# Patient Record
Sex: MALE | Race: WHITE | NOT HISPANIC OR LATINO | Employment: OTHER | URBAN - METROPOLITAN AREA
[De-identification: names, ages, dates, MRNs, and addresses within clinical notes are randomized per-mention and may not be internally consistent; named-entity substitution may affect disease eponyms.]

---

## 2023-01-01 ENCOUNTER — APPOINTMENT (OUTPATIENT)
Dept: GENERAL RADIOLOGY | Facility: HOSPITAL | Age: 53
End: 2023-01-01
Payer: MEDICARE

## 2023-01-01 ENCOUNTER — HOSPITAL ENCOUNTER (EMERGENCY)
Facility: HOSPITAL | Age: 53
Discharge: HOME | End: 2023-01-01
Attending: EMERGENCY MEDICINE | Admitting: EMERGENCY MEDICINE
Payer: MEDICARE

## 2023-01-01 ENCOUNTER — APPOINTMENT (OUTPATIENT)
Dept: CT IMAGING | Facility: HOSPITAL | Age: 53
End: 2023-01-01
Payer: MEDICARE

## 2023-01-01 VITALS
SYSTOLIC BLOOD PRESSURE: 120 MMHG | BODY MASS INDEX: 42.59 KG/M2 | HEART RATE: 78 BPM | OXYGEN SATURATION: 92 % | DIASTOLIC BLOOD PRESSURE: 72 MMHG | HEIGHT: 66 IN | WEIGHT: 264.99 LBS | TEMPERATURE: 99.1 F | RESPIRATION RATE: 18 BRPM

## 2023-01-01 DIAGNOSIS — R60.0 PEDAL EDEMA: ICD-10-CM

## 2023-01-01 DIAGNOSIS — J44.1 ACUTE EXACERBATION OF CHRONIC OBSTRUCTIVE PULMONARY DISEASE (COPD): Primary | ICD-10-CM

## 2023-01-01 LAB
ALBUMIN SERPL-MCNC: 3.9 G/DL (ref 3.5–5.2)
ALBUMIN/GLOB SERPL: 0.8 G/DL
ALP SERPL-CCNC: 143 U/L (ref 39–117)
ALT SERPL W P-5'-P-CCNC: 51 U/L (ref 1–41)
AMMONIA BLD-SCNC: 54 UMOL/L (ref 16–60)
AMPHET+METHAMPHET UR QL: NEGATIVE
ANION GAP SERPL CALCULATED.3IONS-SCNC: 10.1 MMOL/L (ref 5–15)
APTT PPP: 34.3 SECONDS (ref 24.2–34.2)
ARTERIAL PATENCY WRIST A: ABNORMAL
AST SERPL-CCNC: 59 U/L (ref 1–40)
BARBITURATES UR QL SCN: NEGATIVE
BASE EXCESS BLDA CALC-SCNC: 5.6 MMOL/L (ref -2–2)
BASOPHILS # BLD AUTO: 0.06 10*3/MM3 (ref 0–0.2)
BASOPHILS NFR BLD AUTO: 0.9 % (ref 0–1.5)
BDY SITE: ABNORMAL
BENZODIAZ UR QL SCN: NEGATIVE
BILIRUB SERPL-MCNC: 0.4 MG/DL (ref 0–1.2)
BILIRUB UR QL STRIP: NEGATIVE
BUN SERPL-MCNC: 15 MG/DL (ref 6–20)
BUN/CREAT SERPL: 14.6 (ref 7–25)
CA-I BLDA-SCNC: 1.16 MMOL/L (ref 1.13–1.32)
CALCIUM SPEC-SCNC: 9.4 MG/DL (ref 8.6–10.5)
CANNABINOIDS SERPL QL: NEGATIVE
CHLORIDE BLDA-SCNC: 101 MMOL/L (ref 98–106)
CHLORIDE SERPL-SCNC: 101 MMOL/L (ref 98–107)
CLARITY UR: CLEAR
CO2 SERPL-SCNC: 26.9 MMOL/L (ref 22–29)
COCAINE UR QL: NEGATIVE
COHGB MFR BLD: 3.3 % (ref 0–1.5)
COLOR UR: YELLOW
CREAT SERPL-MCNC: 1.03 MG/DL (ref 0.76–1.27)
DEPRECATED RDW RBC AUTO: 54.9 FL (ref 37–54)
EGFRCR SERPLBLD CKD-EPI 2021: 86.3 ML/MIN/1.73
EOSINOPHIL # BLD AUTO: 0.36 10*3/MM3 (ref 0–0.4)
EOSINOPHIL NFR BLD AUTO: 5.5 % (ref 0.3–6.2)
ERYTHROCYTE [DISTWIDTH] IN BLOOD BY AUTOMATED COUNT: 15.2 % (ref 12.3–15.4)
ETHANOL BLD-MCNC: <10 MG/DL (ref 0–10)
ETHANOL UR QL: <0.01 %
FHHB: 3.3 % (ref 0–5)
FLUAV AG NPH QL: NEGATIVE
FLUBV AG NPH QL IA: NEGATIVE
GAS FLOW AIRWAY: 2 LPM
GLOBULIN UR ELPH-MCNC: 4.8 GM/DL
GLUCOSE BLDA-MCNC: 96 MG/DL (ref 70–99)
GLUCOSE BLDC GLUCOMTR-MCNC: 108 MG/DL (ref 70–99)
GLUCOSE SERPL-MCNC: 88 MG/DL (ref 65–99)
GLUCOSE UR STRIP-MCNC: NEGATIVE MG/DL
HCO3 BLDA-SCNC: 31.7 MMOL/L (ref 22–26)
HCT VFR BLD AUTO: 45.2 % (ref 37.5–51)
HGB BLD-MCNC: 14.8 G/DL (ref 13–17.7)
HGB BLDA-MCNC: 16.1 G/DL (ref 13.8–16.4)
HGB UR QL STRIP.AUTO: NEGATIVE
HOLD SPECIMEN: NORMAL
HOLD SPECIMEN: NORMAL
IMM GRANULOCYTES # BLD AUTO: 0.1 10*3/MM3 (ref 0–0.05)
IMM GRANULOCYTES NFR BLD AUTO: 1.5 % (ref 0–0.5)
INHALED O2 CONCENTRATION: 28 %
INR PPP: 1 (ref 0.86–1.15)
KETONES UR QL STRIP: NEGATIVE
LACTATE BLDA-SCNC: 1.5 MMOL/L (ref 0.5–2)
LEUKOCYTE ESTERASE UR QL STRIP.AUTO: NEGATIVE
LIPASE SERPL-CCNC: 41 U/L (ref 13–60)
LYMPHOCYTES # BLD AUTO: 1.26 10*3/MM3 (ref 0.7–3.1)
LYMPHOCYTES NFR BLD AUTO: 19.1 % (ref 19.6–45.3)
MCH RBC QN AUTO: 31.9 PG (ref 26.6–33)
MCHC RBC AUTO-ENTMCNC: 32.7 G/DL (ref 31.5–35.7)
MCV RBC AUTO: 97.4 FL (ref 79–97)
METHADONE UR QL SCN: NEGATIVE
METHGB BLD QL: 0.2 % (ref 0–1.5)
MODALITY: ABNORMAL
MONOCYTES # BLD AUTO: 0.92 10*3/MM3 (ref 0.1–0.9)
MONOCYTES NFR BLD AUTO: 13.9 % (ref 5–12)
NEUTROPHILS NFR BLD AUTO: 3.9 10*3/MM3 (ref 1.7–7)
NEUTROPHILS NFR BLD AUTO: 59.1 % (ref 42.7–76)
NITRITE UR QL STRIP: NEGATIVE
NOTE: ABNORMAL
NRBC BLD AUTO-RTO: 0 /100 WBC (ref 0–0.2)
NT-PROBNP SERPL-MCNC: <36 PG/ML (ref 0–900)
OPIATES UR QL: NEGATIVE
OXYCODONE UR QL SCN: NEGATIVE
OXYHGB MFR BLDV: 93.2 % (ref 94–99)
PCO2 BLDA: 51.1 MM HG (ref 35–45)
PH BLDA: 7.41 PH UNITS (ref 7.35–7.45)
PH UR STRIP.AUTO: 6 [PH] (ref 5–8)
PLATELET # BLD AUTO: 174 10*3/MM3 (ref 140–450)
PMV BLD AUTO: 12.1 FL (ref 6–12)
PO2 BLD: 312 MM[HG] (ref 0–500)
PO2 BLDA: 87.4 MM HG (ref 80–100)
POTASSIUM BLDA-SCNC: 3.5 MMOL/L (ref 3.5–5)
POTASSIUM SERPL-SCNC: 3.9 MMOL/L (ref 3.5–5.2)
PROT SERPL-MCNC: 8.7 G/DL (ref 6–8.5)
PROT UR QL STRIP: NEGATIVE
PROTHROMBIN TIME: 13.3 SECONDS (ref 11.8–14.9)
RBC # BLD AUTO: 4.64 10*6/MM3 (ref 4.14–5.8)
SAO2 % BLDCOA: 96.6 % (ref 95–99)
SODIUM BLDA-SCNC: 141.2 MMOL/L (ref 136–146)
SODIUM SERPL-SCNC: 138 MMOL/L (ref 136–145)
SP GR UR STRIP: 1.01 (ref 1–1.03)
UROBILINOGEN UR QL STRIP: NORMAL
WBC NRBC COR # BLD: 6.6 10*3/MM3 (ref 3.4–10.8)
WHOLE BLOOD HOLD COAG: NORMAL
WHOLE BLOOD HOLD SPECIMEN: NORMAL

## 2023-01-01 PROCEDURE — 80307 DRUG TEST PRSMV CHEM ANLYZR: CPT | Performed by: PHYSICIAN ASSISTANT

## 2023-01-01 PROCEDURE — 94799 UNLISTED PULMONARY SVC/PX: CPT

## 2023-01-01 PROCEDURE — 0 IOPAMIDOL PER 1 ML: Performed by: PHYSICIAN ASSISTANT

## 2023-01-01 PROCEDURE — 82962 GLUCOSE BLOOD TEST: CPT

## 2023-01-01 PROCEDURE — 96374 THER/PROPH/DIAG INJ IV PUSH: CPT

## 2023-01-01 PROCEDURE — 94664 DEMO&/EVAL PT USE INHALER: CPT

## 2023-01-01 PROCEDURE — 96375 TX/PRO/DX INJ NEW DRUG ADDON: CPT

## 2023-01-01 PROCEDURE — 85610 PROTHROMBIN TIME: CPT | Performed by: PHYSICIAN ASSISTANT

## 2023-01-01 PROCEDURE — 25010000002 FUROSEMIDE PER 20 MG: Performed by: PHYSICIAN ASSISTANT

## 2023-01-01 PROCEDURE — 83050 HGB METHEMOGLOBIN QUAN: CPT | Performed by: EMERGENCY MEDICINE

## 2023-01-01 PROCEDURE — 83690 ASSAY OF LIPASE: CPT | Performed by: PHYSICIAN ASSISTANT

## 2023-01-01 PROCEDURE — 82805 BLOOD GASES W/O2 SATURATION: CPT | Performed by: EMERGENCY MEDICINE

## 2023-01-01 PROCEDURE — 80053 COMPREHEN METABOLIC PANEL: CPT

## 2023-01-01 PROCEDURE — 85730 THROMBOPLASTIN TIME PARTIAL: CPT | Performed by: PHYSICIAN ASSISTANT

## 2023-01-01 PROCEDURE — 25010000002 KETOROLAC TROMETHAMINE PER 15 MG: Performed by: EMERGENCY MEDICINE

## 2023-01-01 PROCEDURE — 94640 AIRWAY INHALATION TREATMENT: CPT

## 2023-01-01 PROCEDURE — 36600 WITHDRAWAL OF ARTERIAL BLOOD: CPT | Performed by: EMERGENCY MEDICINE

## 2023-01-01 PROCEDURE — 82375 ASSAY CARBOXYHB QUANT: CPT | Performed by: EMERGENCY MEDICINE

## 2023-01-01 PROCEDURE — 81003 URINALYSIS AUTO W/O SCOPE: CPT | Performed by: PHYSICIAN ASSISTANT

## 2023-01-01 PROCEDURE — 82077 ASSAY SPEC XCP UR&BREATH IA: CPT | Performed by: PHYSICIAN ASSISTANT

## 2023-01-01 PROCEDURE — 83880 ASSAY OF NATRIURETIC PEPTIDE: CPT | Performed by: PHYSICIAN ASSISTANT

## 2023-01-01 PROCEDURE — 36415 COLL VENOUS BLD VENIPUNCTURE: CPT

## 2023-01-01 PROCEDURE — 85025 COMPLETE CBC W/AUTO DIFF WBC: CPT | Performed by: EMERGENCY MEDICINE

## 2023-01-01 PROCEDURE — 71045 X-RAY EXAM CHEST 1 VIEW: CPT

## 2023-01-01 PROCEDURE — 99284 EMERGENCY DEPT VISIT MOD MDM: CPT

## 2023-01-01 PROCEDURE — 74177 CT ABD & PELVIS W/CONTRAST: CPT

## 2023-01-01 PROCEDURE — 87804 INFLUENZA ASSAY W/OPTIC: CPT | Performed by: PHYSICIAN ASSISTANT

## 2023-01-01 PROCEDURE — 82140 ASSAY OF AMMONIA: CPT | Performed by: PHYSICIAN ASSISTANT

## 2023-01-01 RX ORDER — FUROSEMIDE 10 MG/ML
40 INJECTION INTRAMUSCULAR; INTRAVENOUS ONCE
Status: COMPLETED | OUTPATIENT
Start: 2023-01-01 | End: 2023-01-01

## 2023-01-01 RX ORDER — KETOROLAC TROMETHAMINE 30 MG/ML
30 INJECTION, SOLUTION INTRAMUSCULAR; INTRAVENOUS ONCE
Status: COMPLETED | OUTPATIENT
Start: 2023-01-01 | End: 2023-01-01

## 2023-01-01 RX ORDER — CYCLOBENZAPRINE HCL 10 MG
10 TABLET ORAL 2 TIMES DAILY PRN
COMMUNITY
End: 2023-02-16

## 2023-01-01 RX ORDER — PREDNISONE 50 MG/1
50 TABLET ORAL DAILY
Qty: 4 TABLET | Refills: 0 | Status: SHIPPED | OUTPATIENT
Start: 2023-01-01 | End: 2023-02-16

## 2023-01-01 RX ORDER — QUETIAPINE 200 MG/1
200 TABLET, FILM COATED, EXTENDED RELEASE ORAL NIGHTLY
COMMUNITY
End: 2023-02-16

## 2023-01-01 RX ORDER — GABAPENTIN 300 MG/1
300 CAPSULE ORAL 3 TIMES DAILY
COMMUNITY
End: 2023-02-16

## 2023-01-01 RX ORDER — NAPROXEN 500 MG/1
500 TABLET ORAL 2 TIMES DAILY PRN
Status: ON HOLD | COMMUNITY
End: 2023-02-20

## 2023-01-01 RX ORDER — ESCITALOPRAM OXALATE 10 MG/1
10 TABLET ORAL DAILY
COMMUNITY
End: 2023-02-16

## 2023-01-01 RX ORDER — FUROSEMIDE 40 MG/1
40 TABLET ORAL 3 TIMES DAILY
COMMUNITY

## 2023-01-01 RX ORDER — IPRATROPIUM BROMIDE AND ALBUTEROL SULFATE 2.5; .5 MG/3ML; MG/3ML
3 SOLUTION RESPIRATORY (INHALATION)
Status: COMPLETED | OUTPATIENT
Start: 2023-01-01 | End: 2023-01-01

## 2023-01-01 RX ORDER — AMLODIPINE BESYLATE 5 MG/1
5 TABLET ORAL DAILY
Status: ON HOLD | COMMUNITY
End: 2023-02-20

## 2023-01-01 RX ADMIN — FUROSEMIDE 40 MG: 10 INJECTION, SOLUTION INTRAMUSCULAR; INTRAVENOUS at 17:01

## 2023-01-01 RX ADMIN — IPRATROPIUM BROMIDE AND ALBUTEROL SULFATE 3 ML: 2.5; .5 SOLUTION RESPIRATORY (INHALATION) at 22:24

## 2023-01-01 RX ADMIN — IOPAMIDOL 100 ML: 755 INJECTION, SOLUTION INTRAVENOUS at 18:04

## 2023-01-01 RX ADMIN — IPRATROPIUM BROMIDE AND ALBUTEROL SULFATE 3 ML: 2.5; .5 SOLUTION RESPIRATORY (INHALATION) at 22:19

## 2023-01-01 RX ADMIN — IPRATROPIUM BROMIDE AND ALBUTEROL SULFATE 3 ML: 2.5; .5 SOLUTION RESPIRATORY (INHALATION) at 22:37

## 2023-01-01 RX ADMIN — KETOROLAC TROMETHAMINE 30 MG: 30 INJECTION, SOLUTION INTRAMUSCULAR; INTRAVENOUS at 22:17
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
## 2023-01-02 NOTE — DISCHARGE INSTRUCTIONS
Please increase your furosemide dose to 80 mg (2 tablets) twice a day for the next 5 days.  Please elevate your feet as much as possible.  Please consider using compression stockings whenever you are not elevating her feet.

## 2023-01-04 ENCOUNTER — APPOINTMENT (OUTPATIENT)
Dept: CT IMAGING | Facility: HOSPITAL | Age: 53
End: 2023-01-04
Payer: MEDICARE

## 2023-01-04 LAB
ALBUMIN SERPL-MCNC: 4.1 G/DL (ref 3.5–5.2)
ALBUMIN/GLOB SERPL: 0.9 G/DL
ALP SERPL-CCNC: 137 U/L (ref 39–117)
ALT SERPL W P-5'-P-CCNC: 38 U/L (ref 1–41)
ANION GAP SERPL CALCULATED.3IONS-SCNC: 10 MMOL/L (ref 5–15)
AST SERPL-CCNC: 42 U/L (ref 1–40)
BASOPHILS # BLD AUTO: 0.07 10*3/MM3 (ref 0–0.2)
BASOPHILS NFR BLD AUTO: 1 % (ref 0–1.5)
BILIRUB SERPL-MCNC: 0.5 MG/DL (ref 0–1.2)
BUN SERPL-MCNC: 15 MG/DL (ref 6–20)
BUN/CREAT SERPL: 14 (ref 7–25)
CALCIUM SPEC-SCNC: 9.7 MG/DL (ref 8.6–10.5)
CHLORIDE SERPL-SCNC: 99 MMOL/L (ref 98–107)
CO2 SERPL-SCNC: 28 MMOL/L (ref 22–29)
CREAT SERPL-MCNC: 1.07 MG/DL (ref 0.76–1.27)
DEPRECATED RDW RBC AUTO: 53.5 FL (ref 37–54)
EGFRCR SERPLBLD CKD-EPI 2021: 82.5 ML/MIN/1.73
EOSINOPHIL # BLD AUTO: 0.38 10*3/MM3 (ref 0–0.4)
EOSINOPHIL NFR BLD AUTO: 5.5 % (ref 0.3–6.2)
ERYTHROCYTE [DISTWIDTH] IN BLOOD BY AUTOMATED COUNT: 15.1 % (ref 12.3–15.4)
GLOBULIN UR ELPH-MCNC: 4.7 GM/DL
GLUCOSE SERPL-MCNC: 94 MG/DL (ref 65–99)
HCT VFR BLD AUTO: 45.3 % (ref 37.5–51)
HGB BLD-MCNC: 15 G/DL (ref 13–17.7)
HOLD SPECIMEN: NORMAL
HOLD SPECIMEN: NORMAL
IMM GRANULOCYTES # BLD AUTO: 0.04 10*3/MM3 (ref 0–0.05)
IMM GRANULOCYTES NFR BLD AUTO: 0.6 % (ref 0–0.5)
LYMPHOCYTES # BLD AUTO: 1.51 10*3/MM3 (ref 0.7–3.1)
LYMPHOCYTES NFR BLD AUTO: 22 % (ref 19.6–45.3)
MCH RBC QN AUTO: 31.5 PG (ref 26.6–33)
MCHC RBC AUTO-ENTMCNC: 33.1 G/DL (ref 31.5–35.7)
MCV RBC AUTO: 95.2 FL (ref 79–97)
MONOCYTES # BLD AUTO: 0.64 10*3/MM3 (ref 0.1–0.9)
MONOCYTES NFR BLD AUTO: 9.3 % (ref 5–12)
NEUTROPHILS NFR BLD AUTO: 4.22 10*3/MM3 (ref 1.7–7)
NEUTROPHILS NFR BLD AUTO: 61.6 % (ref 42.7–76)
NRBC BLD AUTO-RTO: 0 /100 WBC (ref 0–0.2)
NT-PROBNP SERPL-MCNC: <36 PG/ML (ref 0–900)
PLATELET # BLD AUTO: 165 10*3/MM3 (ref 140–450)
PMV BLD AUTO: 12.6 FL (ref 6–12)
POTASSIUM SERPL-SCNC: 4.2 MMOL/L (ref 3.5–5.2)
PROT SERPL-MCNC: 8.8 G/DL (ref 6–8.5)
RBC # BLD AUTO: 4.76 10*6/MM3 (ref 4.14–5.8)
SODIUM SERPL-SCNC: 137 MMOL/L (ref 136–145)
WBC NRBC COR # BLD: 6.86 10*3/MM3 (ref 3.4–10.8)
WHOLE BLOOD HOLD COAG: NORMAL
WHOLE BLOOD HOLD SPECIMEN: NORMAL

## 2023-01-04 PROCEDURE — 36415 COLL VENOUS BLD VENIPUNCTURE: CPT

## 2023-01-04 PROCEDURE — 99283 EMERGENCY DEPT VISIT LOW MDM: CPT

## 2023-01-04 PROCEDURE — 85025 COMPLETE CBC W/AUTO DIFF WBC: CPT

## 2023-01-04 PROCEDURE — 80053 COMPREHEN METABOLIC PANEL: CPT

## 2023-01-04 PROCEDURE — 74176 CT ABD & PELVIS W/O CONTRAST: CPT

## 2023-01-04 PROCEDURE — 83880 ASSAY OF NATRIURETIC PEPTIDE: CPT

## 2023-01-05 ENCOUNTER — HOSPITAL ENCOUNTER (EMERGENCY)
Facility: HOSPITAL | Age: 53
Discharge: HOME | End: 2023-01-05
Attending: EMERGENCY MEDICINE | Admitting: EMERGENCY MEDICINE
Payer: MEDICARE

## 2023-01-05 VITALS
RESPIRATION RATE: 18 BRPM | HEART RATE: 74 BPM | TEMPERATURE: 98.6 F | WEIGHT: 263.23 LBS | SYSTOLIC BLOOD PRESSURE: 128 MMHG | BODY MASS INDEX: 41.31 KG/M2 | OXYGEN SATURATION: 96 % | DIASTOLIC BLOOD PRESSURE: 83 MMHG | HEIGHT: 67 IN

## 2023-01-05 DIAGNOSIS — R60.9 PERIPHERAL EDEMA: Primary | ICD-10-CM

## 2023-01-05 RX ORDER — FUROSEMIDE 80 MG
80 TABLET ORAL ONCE
Status: COMPLETED | OUTPATIENT
Start: 2023-01-05 | End: 2023-01-05

## 2023-01-05 RX ADMIN — FUROSEMIDE 80 MG: 80 TABLET ORAL at 07:09

## 2023-01-05 NOTE — ED PROVIDER NOTES
Time: 6:59 AM EST  Date of encounter:  1/4/2023  Independent Historian/Clinical History and Information was obtained by:   Patient  Chief Complaint: Leg swelling    History is limited by: N/A    History of Present Illness:  Patient is a 52 y.o. year old male who presents to the emergency department for evaluation of lower extremity swelling patient states he has a history of cirrhosis and leg edema.  He states compliance with his diuretic medications.  Denies new issues with this.  Denies erythema, pain, fever.    HPI    Patient Care Team  Primary Care Provider: Provider, Raquel Known    Past Medical History:     Allergies   Allergen Reactions   • Penicillins Hives     Past Medical History:   Diagnosis Date   • Cirrhosis of liver (HCC)    • COPD (chronic obstructive pulmonary disease) (HCC)    • Hypertension      Past Surgical History:   Procedure Laterality Date   • PARACENTESIS      abd     No family history on file.    Home Medications:  Prior to Admission medications    Medication Sig Start Date End Date Taking? Authorizing Provider   amLODIPine (NORVASC) 5 MG tablet Take 5 mg by mouth Daily.    Mila Wooten MD   cyclobenzaprine (FLEXERIL) 10 MG tablet Take 10 mg by mouth 2 (Two) Times a Day As Needed for Muscle Spasms.    Mila Wooten MD   escitalopram (LEXAPRO) 10 MG tablet Take 10 mg by mouth Daily.    Mila Wooten MD   furosemide (LASIX) 40 MG tablet Take 40 mg by mouth 3 (Three) Times a Day.    Mila Wooten MD   gabapentin (NEURONTIN) 300 MG capsule Take 300 mg by mouth 3 (Three) Times a Day.    Mila Wooetn MD   naproxen (NAPROSYN) 500 MG tablet Take 500 mg by mouth 2 (Two) Times a Day With Meals.    Mila Wooten MD   predniSONE (DELTASONE) 50 MG tablet Take 1 tablet by mouth Daily. 1/1/23   Ravi Jarvis MD   QUEtiapine XR (SEROquel XR) 200 MG 24 hr tablet Take 200 mg by mouth Every Night.    Mila Wooten MD        Social History:   Social  History     Tobacco Use   • Smoking status: Every Day     Packs/day: 1.00     Types: Cigarettes   • Smokeless tobacco: Never   Vaping Use   • Vaping Use: Never used   Substance Use Topics   • Alcohol use: Not Currently     Comment: recovering alcholic   • Drug use: Not Currently         Review of Systems:  Review of Systems   Constitutional: Negative for chills and fever.   HENT: Negative for congestion, rhinorrhea and sore throat.    Eyes: Negative for pain and visual disturbance.   Respiratory: Negative for apnea, cough, chest tightness and shortness of breath.    Cardiovascular: Positive for leg swelling. Negative for chest pain and palpitations.   Gastrointestinal: Negative for abdominal pain, diarrhea, nausea and vomiting.   Genitourinary: Negative for difficulty urinating and dysuria.   Musculoskeletal: Negative for joint swelling and myalgias.   Skin: Negative for color change.   Neurological: Negative for seizures and headaches.   Psychiatric/Behavioral: Negative.    All other systems reviewed and are negative.       Physical Exam:  /83   Pulse 74   Temp 98.6 °F (37 °C) (Oral)   Resp 18   Ht 170.2 cm (67\")   Wt 119 kg (263 lb 3.7 oz)   SpO2 96%   BMI 41.23 kg/m²     Physical Exam  Vitals and nursing note reviewed.   Constitutional:       General: He is not in acute distress.     Appearance: Normal appearance. He is not toxic-appearing.   HENT:      Head: Normocephalic and atraumatic.      Jaw: There is normal jaw occlusion.   Eyes:      General: Lids are normal.      Extraocular Movements: Extraocular movements intact.      Conjunctiva/sclera: Conjunctivae normal.      Pupils: Pupils are equal, round, and reactive to light.   Cardiovascular:      Rate and Rhythm: Normal rate and regular rhythm.      Pulses: Normal pulses.      Heart sounds: Normal heart sounds.      Comments: 3+ pitting edema bilateral lower extremities  Pulmonary:      Effort: Pulmonary effort is normal. No respiratory  distress.      Breath sounds: Normal breath sounds. No wheezing or rhonchi.   Abdominal:      General: Abdomen is flat.      Palpations: Abdomen is soft.      Tenderness: There is no abdominal tenderness. There is no guarding or rebound.   Musculoskeletal:         General: Normal range of motion.      Cervical back: Normal range of motion and neck supple.      Right lower leg: No edema.      Left lower leg: No edema.   Skin:     General: Skin is warm and dry.   Neurological:      General: No focal deficit present.      Mental Status: He is alert and oriented to person, place, and time. Mental status is at baseline.   Psychiatric:         Mood and Affect: Mood normal.         Behavior: Behavior normal.                  Procedures:  Procedures      Medical Decision Making:      Comorbidities that affect care:    Hypertension, Smoking    External Notes reviewed:    Previous ED Note      The following orders were placed and all results were independently analyzed by me:  Orders Placed This Encounter   Procedures   • CT Abdomen Pelvis Without Contrast   • [place] Draw   • Comprehensive Metabolic Panel   • BNP   • CBC Auto Differential   • NPO Diet NPO Type: Strict NPO   • Undress & Gown   • CBC & Differential   • Green Top (Gel)   • Lavender Top   • Gold Top - SST   • Light Blue Top       Medications Given in the Emergency Department:  Medications   furosemide (LASIX) tablet 80 mg (has no administration in time range)        ED Course:    ED Course as of 01/05/23 0705   Thu Jan 05, 2023   0241 Narrative & Impression  PROCEDURE:  CT ABDOMEN PELVIS W CONTRAST     COMPARISON: [facility], CR, XR CHEST 1 VW, 1/01/2023, 16:53.     INDICATIONS:  ascites, lower abdominal pain     TECHNIQUE:    CT images were created with non-ionic intravenous contrast material.       PROTOCOL:     Standard imaging protocol performed                 RADIATION:      DLP: 1910.6mGy*cm               Automated exposure control was  utilized to minimize radiation dose.   CONTRAST:      100cc Isovue 370 I.V.     FINDINGS:          The lung bases are clear.     The liver is slightly enlarged.  The liver is of diffusely diminished density consistent with mild   fatty infiltration.  The liver has a lobular contour.  The left liver lobe is relatively large.    The spleen is slightly enlarged, measuring 17.2 cm x 8.5 cm in greatest AP and transverse   dimension.  Findings are consistent with cirrhosis.  Findings consistent with significant portal   hypertension are evident, with esophageal varices.  The umbilical vein is recanalized.     A scant amount of peritoneal fluid is seen along the inferior aspect of the right liver lobe.     The gallbladder is not abnormally distended.  No pancreatic or adrenal mass is evident.  The   kidneys enhance bilaterally.  No renal or ureteral stones are seen.  There is no evidence of   hydronephrosis.     Bowel loops are not abnormally dilated.  The appendix is normal.  No diverticula are evident.     The abdominal aorta is of normal caliber.  Mild degenerative spurring is seen in the spine.  Mesh   umbilical hernia repair appears sound.     CONTINUED ON NEXT PAGE...  IMPRESSION:  CT scan of the abdomen and pelvis with IV contrast demonstrating cirrhosis with portal   hypertension.     A scant amount of peritoneal fluid is seen along the inferior aspect of the right liver lobe.     Mesh umbilical hernia repair appears sound.   TANVIR MILLER MD         Electronically Signed and Approved By: TANVIR MILLER MD on 1/01/2023 at 19:00   [TC]      ED Course User Index  [TC] Shania Valencia APRN       Labs:    Lab Results (last 24 hours)     Procedure Component Value Units Date/Time    CBC & Differential [[id number]]  (Abnormal) Collected: 01/04/23 2004    Specimen: Blood Updated: 01/04/23 2031    Narrative:      The following orders were created for panel order CBC & Differential.  Procedure                                Abnormality         Status                     ---------                               -----------         ------                     CBC Auto Differential[[id number]]        Abnormal            Final result                 Please view results for these tests on the individual orders.    Comprehensive Metabolic Panel [[id number]]  (Abnormal) Collected: 01/04/23 2004    Specimen: Blood Updated: 01/04/23 2100     Glucose 94 mg/dL      BUN 15 mg/dL      Creatinine 1.07 mg/dL      Sodium 137 mmol/L      Potassium 4.2 mmol/L      Chloride 99 mmol/L      CO2 28.0 mmol/L      Calcium 9.7 mg/dL      Total Protein 8.8 g/dL      Albumin 4.1 g/dL      ALT (SGPT) 38 U/L      AST (SGOT) 42 U/L      Alkaline Phosphatase 137 U/L      Total Bilirubin 0.5 mg/dL      Globulin 4.7 gm/dL      A/G Ratio 0.9 g/dL      BUN/Creatinine Ratio 14.0     Anion Gap 10.0 mmol/L      eGFR 82.5 mL/min/1.73      Comment: National Kidney Foundation and American Society of Nephrology (ASN) Task Force recommended calculation based on the Chronic Kidney Disease Epidemiology Collaboration (CKD-EPI) equation refit without adjustment for race.       Narrative:      GFR Normal >60  Chronic Kidney Disease <60  Kidney Failure <15      BNP [[id number]]  (Normal) Collected: 01/04/23 2004    Specimen: Blood Updated: 01/04/23 2053     proBNP <36.0 pg/mL     Narrative:      Among patients with dyspnea, NT-proBNP is highly sensitive for the detection of acute congestive heart failure. In addition NT-proBNP of <300 pg/ml effectively rules out acute congestive heart failure with 99% negative predictive value.      CBC Auto Differential [[id number]]  (Abnormal) Collected: 01/04/23 2004    Specimen: Blood Updated: 01/04/23 2031     WBC 6.86 10*3/mm3      RBC 4.76 10*6/mm3      Hemoglobin 15.0 g/dL      Hematocrit 45.3 %      MCV 95.2 fL      MCH 31.5 pg      MCHC 33.1 g/dL      RDW 15.1 %      RDW-SD 53.5 fl      MPV 12.6 fL      Platelets 165 10*3/mm3      Neutrophil %  61.6 %      Lymphocyte % 22.0 %      Monocyte % 9.3 %      Eosinophil % 5.5 %      Basophil % 1.0 %      Immature Grans % 0.6 %      Neutrophils, Absolute 4.22 10*3/mm3      Lymphocytes, Absolute 1.51 10*3/mm3      Monocytes, Absolute 0.64 10*3/mm3      Eosinophils, Absolute 0.38 10*3/mm3      Basophils, Absolute 0.07 10*3/mm3      Immature Grans, Absolute 0.04 10*3/mm3      nRBC 0.0 /100 WBC            Imaging:    CT Abdomen Pelvis Without Contrast    Result Date: 1/4/2023  PROCEDURE: CT ABDOMEN PELVIS WO CONTRAST  COMPARISON: 1/1/2023.  INDICATIONS: pt. states hx of cirrhosis; the pt. states hx of fluid drawn from abdomen 4-5 years ago; c/o lower abdominal pain x 2 days & swollen bilateral lower extremities  TECHNIQUE: 706 CT images were created without intravenous or oral contrast agent administration.   PROTOCOL:   Standard CT imaging protocol performed.    RADIATION:   Total DLP: 1,095.7 mGy*cm   Automated exposure control was utilized to minimize radiation dose.  FINDINGS: There is cirrhosis with portal hypertension and portosystemic venous shunting.  Splenomegaly is seen.  Minimal ascites is identified.  Again, portosystemic shunts are suspected, better seen on the prior enhanced CT exam.  There may be portal hypertensive gastropathy, enteropathy, and/or colopathy.  No mechanical bowel obstruction is suspected.  No pneumoperitoneum or pneumatosis.  No portal or mesenteric venous gas.  No acute appendicitis is suggested.   No pneumobilia.  No definite gallstones.  The gallbladder appears contracted, limiting its assessment.  No definite acute pancreatitis.  A small hiatal hernia is seen.   Atherosclerotic changes are seen.  No aneurysmal dilatation of the aortoiliac arterial system is seen.  No acute intraperitoneal or retroperitoneal hemorrhage.  Streak artifact related to internal fixation hardware within the right bony pelvis is noted, as before.  There are degenerative changes of the bilateral sacroiliac  joints.   No hydronephrosis or obstructive uropathy.  No urinary bladder calculi are seen.  The urinary bladder is mildly distended.  Probably no prostatomegaly.   There is motion artifact on the study.  There are small fat-containing bilateral inguinal hernias.  They do not contain bowel.  There may be a small fat-containing umbilical hernia.  The patient has undergone prior ventral hernia repair.   Overall, little interval change is seen since the study performed 1/1/2023, allowing for technique differences.  Please see the prior report for further detail regarding additional findings.       No significant interval change is seen since the 1/1/2023 CT study, allowing for technique differences.  No definite acute findings are appreciated.  Please see above comments for further detail.     Please note that portions of this note were completed with a voice recognition program.  RYLEE ARCEO JR, MD       Electronically Signed and Approved By: RYLEE ARCEO JR, MD on 1/04/2023 at 21:13                  Differential Diagnosis and Discussion:    None    All labs were reviewed and analyzed by me.  All X-rays were independently reviewed by me.    MDM  Number of Diagnoses or Management Options  Peripheral edema  Diagnosis management comments: In summary this is a 52-year-old male who presents to the emergency department from a local [facility] center for evaluation of lower extremity swelling.  He has a history of this and states compliance with his diuretic medications, Lasix.  He simply is requesting something to assist with the swelling in his legs as he has been unable to get this down lately.  CBC independently reviewed by me and shows no critical abnormalities.  CMP independently reviewed by me and shows no critical abnormalities.  CT scan of the abdomen pelvis unremarkable.  Patient's been given an additional dose of his Lasix in the emergency department and will be discharged home.  He is otherwise well-appearing  and in no acute distress.  Very strict return to ER and follow-up instructions have been provided to the patient.         Amount and/or Complexity of Data Reviewed  Clinical lab tests: reviewed  Tests in the radiology section of CPT®: reviewed             Patient Care Considerations:    None      Consultants/Shared Management Plan:    None    Social Determinants of Health:    Patient is independent, reliable, and has access to care.       Disposition and Care Coordination:    Discharged: The patient is suitable and stable for discharge with no need for consideration of observation or admission.    I have explained discharge medications and the need for follow up with the patient/caretakers. This was also printed in the discharge instructions. Patient was discharged with the following medications and follow up:      Medication List      No changes were made to your prescriptions during this visit.      Provider, No Known  [facility] [id number]    In 1 week         Final diagnoses:   Peripheral edema        ED Disposition     ED Disposition   Discharge    Condition   Stable    Comment   --             This medical record created using voice recognition software.           Lalo Serna MD  01/05/23 0705

## 2023-01-05 NOTE — ED TRIAGE NOTES
Pt to ED from Urgent Care with reports of BL lower leg swelling/redness x several months.      Pt c/o pain to lower abdomen as well.  Pt has hx of ascites and paracentesis previously.

## 2023-01-05 NOTE — ED PROVIDER NOTES
Time: 7:45 PM EST  Date of encounter:  1/4/2023  Independent Historian/Clinical History and Information was obtained by:   Patient  Chief Complaint   Patient presents with   • Leg Swelling   • Abdominal Pain       History is limited by: N/A    History of Present Illness:  Patient is a 52 y.o. year old male who presents to the emergency department for evaluation of bilateral lower extremity swelling as well as abdominal swelling/pain.  Patient states he was seen at urgent care and advised to present to ED for further evaluation.  Patient reports a history of cirrhosis and ascites.  He states his legs have been swollen for many months.  He denies fever/chills, shortness of breath, chest pain, nausea/vomiting, diarrhea or constipation.    HPI    Patient Care Team  Primary Care Provider: Provider, Raquel Known    Past Medical History:     Allergies   Allergen Reactions   • Penicillins Hives     Past Medical History:   Diagnosis Date   • Cirrhosis of liver (HCC)    • COPD (chronic obstructive pulmonary disease) (HCC)    • Hypertension      Past Surgical History:   Procedure Laterality Date   • PARACENTESIS      abd     No family history on file.    Home Medications:  Prior to Admission medications    Medication Sig Start Date End Date Taking? Authorizing Provider   amLODIPine (NORVASC) 5 MG tablet Take 5 mg by mouth Daily.    Mila Wooten MD   cyclobenzaprine (FLEXERIL) 10 MG tablet Take 10 mg by mouth 2 (Two) Times a Day As Needed for Muscle Spasms.    Mila Wooten MD   escitalopram (LEXAPRO) 10 MG tablet Take 10 mg by mouth Daily.    Mila Wooten MD   furosemide (LASIX) 40 MG tablet Take 40 mg by mouth 3 (Three) Times a Day.    Mila Wooten MD   gabapentin (NEURONTIN) 300 MG capsule Take 300 mg by mouth 3 (Three) Times a Day.    Mila Wooten MD   naproxen (NAPROSYN) 500 MG tablet Take 500 mg by mouth 2 (Two) Times a Day With Meals.    Mila Wooten MD   predniSONE  (DELTASONE) 50 MG tablet Take 1 tablet by mouth Daily. 1/1/23   Ravi Jarvis MD   QUEtiapine XR (SEROquel XR) 200 MG 24 hr tablet Take 200 mg by mouth Every Night.    Provider, MD Mila        Social History:   Social History     Tobacco Use   • Smoking status: Every Day     Packs/day: 1.00     Types: Cigarettes   • Smokeless tobacco: Never   Vaping Use   • Vaping Use: Never used   Substance Use Topics   • Alcohol use: Not Currently     Comment: recovering alcholic   • Drug use: Not Currently         Review of Systems:  Review of Systems   Cardiovascular: Positive for leg swelling.   Gastrointestinal: Positive for abdominal pain. Negative for nausea.        Physical Exam:  /74 (BP Location: Left arm, Patient Position: Sitting)   Pulse 83   Temp 98.6 °F (37 °C) (Oral)   Resp 18   Ht 170.2 cm (67\")   Wt 119 kg (263 lb 3.7 oz)   SpO2 100%   BMI 41.23 kg/m²     Physical Exam  Constitutional:       Appearance: Normal appearance.   HENT:      Head: Normocephalic.   Eyes:      Extraocular Movements: Extraocular movements intact.      Conjunctiva/sclera: Conjunctivae normal.   Pulmonary:      Effort: Pulmonary effort is normal.   Abdominal:      General: There is no distension.   Musculoskeletal:      Right lower leg: 3+ Edema present.      Left lower leg: 3+ Edema present.   Skin:     General: Skin is warm.      Coloration: Skin is not cyanotic.   Neurological:      Mental Status: He is alert and oriented to person, place, and time.   Psychiatric:         Attention and Perception: Attention and perception normal.         Mood and Affect: Mood normal.                  Procedures:  Procedures      Medical Decision Making:      Comorbidities that affect care:    {Comorbidities that affect care:75835}    External Notes reviewed:    {External Note review (Optional):31427}      The following orders were placed and all results were independently analyzed by me:  Orders Placed This Encounter   Procedures   •  CT Abdomen Pelvis Without Contrast   • [place] Draw   • Comprehensive Metabolic Panel   • BNP   • CBC Auto Differential   • NPO Diet NPO Type: Strict NPO   • Undress & Gown   • CBC & Differential   • Green Top (Gel)   • Lavender Top   • Gold Top - SST   • Light Blue Top       Medications Given in the Emergency Department:  Medications - No data to display     ED Course:    The patient was initially evaluated in the triage area where orders were placed. The patient was later dispositioned by SHALONDA Winter.      The patient was advised to stay for completion of workup which includes but is not limited to communication of labs and radiological results, reassessment and plan. The patient was advised that leaving prior to disposition by a provider could result in critical findings that are not communicated to the patient.          Labs:    Lab Results (last 24 hours)     Procedure Component Value Units Date/Time    CBC & Differential [[id number]]  (Abnormal) Collected: 01/04/23 2004    Specimen: Blood Updated: 01/04/23 2031    Narrative:      The following orders were created for panel order CBC & Differential.  Procedure                               Abnormality         Status                     ---------                               -----------         ------                     CBC Auto Differential[[id number]]        Abnormal            Final result                 Please view results for these tests on the individual orders.    Comprehensive Metabolic Panel [[id number]]  (Abnormal) Collected: 01/04/23 2004    Specimen: Blood Updated: 01/04/23 2100     Glucose 94 mg/dL      BUN 15 mg/dL      Creatinine 1.07 mg/dL      Sodium 137 mmol/L      Potassium 4.2 mmol/L      Chloride 99 mmol/L      CO2 28.0 mmol/L      Calcium 9.7 mg/dL      Total Protein 8.8 g/dL      Albumin 4.1 g/dL      ALT (SGPT) 38 U/L      AST (SGOT) 42 U/L      Alkaline Phosphatase 137 U/L      Total Bilirubin 0.5 mg/dL      Globulin 4.7 gm/dL       A/G Ratio 0.9 g/dL      BUN/Creatinine Ratio 14.0     Anion Gap 10.0 mmol/L      eGFR 82.5 mL/min/1.73      Comment: National Kidney Foundation and American Society of Nephrology (ASN) Task Force recommended calculation based on the Chronic Kidney Disease Epidemiology Collaboration (CKD-EPI) equation refit without adjustment for race.       Narrative:      GFR Normal >60  Chronic Kidney Disease <60  Kidney Failure <15      BNP [[id number]]  (Normal) Collected: 01/04/23 2004    Specimen: Blood Updated: 01/04/23 2053     proBNP <36.0 pg/mL     Narrative:      Among patients with dyspnea, NT-proBNP is highly sensitive for the detection of acute congestive heart failure. In addition NT-proBNP of <300 pg/ml effectively rules out acute congestive heart failure with 99% negative predictive value.      CBC Auto Differential [[id number]]  (Abnormal) Collected: 01/04/23 2004    Specimen: Blood Updated: 01/04/23 2031     WBC 6.86 10*3/mm3      RBC 4.76 10*6/mm3      Hemoglobin 15.0 g/dL      Hematocrit 45.3 %      MCV 95.2 fL      MCH 31.5 pg      MCHC 33.1 g/dL      RDW 15.1 %      RDW-SD 53.5 fl      MPV 12.6 fL      Platelets 165 10*3/mm3      Neutrophil % 61.6 %      Lymphocyte % 22.0 %      Monocyte % 9.3 %      Eosinophil % 5.5 %      Basophil % 1.0 %      Immature Grans % 0.6 %      Neutrophils, Absolute 4.22 10*3/mm3      Lymphocytes, Absolute 1.51 10*3/mm3      Monocytes, Absolute 0.64 10*3/mm3      Eosinophils, Absolute 0.38 10*3/mm3      Basophils, Absolute 0.07 10*3/mm3      Immature Grans, Absolute 0.04 10*3/mm3      nRBC 0.0 /100 WBC            Imaging:    CT Abdomen Pelvis Without Contrast    Result Date: 1/4/2023  PROCEDURE: CT ABDOMEN PELVIS WO CONTRAST  COMPARISON: 1/1/2023.  INDICATIONS: pt. states hx of cirrhosis; the pt. states hx of fluid drawn from abdomen 4-5 years ago; c/o lower abdominal pain x 2 days & swollen bilateral lower extremities  TECHNIQUE: 706 CT images were created without intravenous or  oral contrast agent administration.   PROTOCOL:   Standard CT imaging protocol performed.    RADIATION:   Total DLP: 1,095.7 mGy*cm   Automated exposure control was utilized to minimize radiation dose.  FINDINGS: There is cirrhosis with portal hypertension and portosystemic venous shunting.  Splenomegaly is seen.  Minimal ascites is identified.  Again, portosystemic shunts are suspected, better seen on the prior enhanced CT exam.  There may be portal hypertensive gastropathy, enteropathy, and/or colopathy.  No mechanical bowel obstruction is suspected.  No pneumoperitoneum or pneumatosis.  No portal or mesenteric venous gas.  No acute appendicitis is suggested.   No pneumobilia.  No definite gallstones.  The gallbladder appears contracted, limiting its assessment.  No definite acute pancreatitis.  A small hiatal hernia is seen.   Atherosclerotic changes are seen.  No aneurysmal dilatation of the aortoiliac arterial system is seen.  No acute intraperitoneal or retroperitoneal hemorrhage.  Streak artifact related to internal fixation hardware within the right bony pelvis is noted, as before.  There are degenerative changes of the bilateral sacroiliac joints.   No hydronephrosis or obstructive uropathy.  No urinary bladder calculi are seen.  The urinary bladder is mildly distended.  Probably no prostatomegaly.   There is motion artifact on the study.  There are small fat-containing bilateral inguinal hernias.  They do not contain bowel.  There may be a small fat-containing umbilical hernia.  The patient has undergone prior ventral hernia repair.   Overall, little interval change is seen since the study performed 1/1/2023, allowing for technique differences.  Please see the prior report for further detail regarding additional findings.       No significant interval change is seen since the 1/1/2023 CT study, allowing for technique differences.  No definite acute findings are appreciated.  Please see above comments for  further detail.     Please note that portions of this note were completed with a voice recognition program.  RYLEE ARCEO JR, MD       Electronically Signed and Approved By: RYLEE ARCEO JR, MD on 1/04/2023 at 21:13                  Differential Diagnosis and Discussion:      {Differentials:88122}    {Independent Review of (Optional):71942}    MDM     {Critical Care:77183}    Patient Care Considerations:    {Considerations (Optional):93036}      Consultants/Shared Management Plan:    {Shared Management Plan (Optional):48698}    Social Determinants of Health:    {Social Determinants of Health (Optional):57020}      Disposition and Care Coordination:    {Admission consideration:78780}    {Discharge (Optional):39316}    Final diagnoses:   None        ED Disposition     None          This medical record created using voice recognition software.

## 2023-01-11 ENCOUNTER — TRANSCRIBE ORDERS (OUTPATIENT)
Dept: ADMINISTRATIVE | Facility: HOSPITAL | Age: 53
End: 2023-01-11
Payer: MEDICARE

## 2023-01-11 DIAGNOSIS — R18.0 ASCITES, MALIGNANT: Primary | ICD-10-CM

## 2023-01-16 ENCOUNTER — APPOINTMENT (OUTPATIENT)
Dept: GENERAL RADIOLOGY | Facility: HOSPITAL | Age: 53
End: 2023-01-16
Payer: MEDICARE

## 2023-01-16 ENCOUNTER — HOSPITAL ENCOUNTER (EMERGENCY)
Facility: HOSPITAL | Age: 53
Discharge: HOME | End: 2023-01-16
Attending: EMERGENCY MEDICINE | Admitting: EMERGENCY MEDICINE
Payer: MEDICARE

## 2023-01-16 VITALS
BODY MASS INDEX: 42 KG/M2 | WEIGHT: 277.12 LBS | DIASTOLIC BLOOD PRESSURE: 84 MMHG | TEMPERATURE: 98.1 F | SYSTOLIC BLOOD PRESSURE: 152 MMHG | HEIGHT: 68 IN | RESPIRATION RATE: 18 BRPM | OXYGEN SATURATION: 98 % | HEART RATE: 84 BPM

## 2023-01-16 DIAGNOSIS — R18.8 CIRRHOSIS OF LIVER WITH ASCITES, UNSPECIFIED HEPATIC CIRRHOSIS TYPE: Primary | ICD-10-CM

## 2023-01-16 DIAGNOSIS — K70.31 ASCITES DUE TO ALCOHOLIC CIRRHOSIS: ICD-10-CM

## 2023-01-16 DIAGNOSIS — K74.60 CIRRHOSIS OF LIVER WITH ASCITES, UNSPECIFIED HEPATIC CIRRHOSIS TYPE: Primary | ICD-10-CM

## 2023-01-16 LAB
ALBUMIN SERPL-MCNC: 4 G/DL (ref 3.5–5.2)
ALBUMIN/GLOB SERPL: 0.9 G/DL
ALP SERPL-CCNC: 132 U/L (ref 39–117)
ALT SERPL W P-5'-P-CCNC: 29 U/L (ref 1–41)
ANION GAP SERPL CALCULATED.3IONS-SCNC: 8.1 MMOL/L (ref 5–15)
APTT PPP: 34.1 SECONDS (ref 24.2–34.2)
AST SERPL-CCNC: 33 U/L (ref 1–40)
BACTERIA UR QL AUTO: ABNORMAL /HPF
BASOPHILS # BLD AUTO: 0.04 10*3/MM3 (ref 0–0.2)
BASOPHILS NFR BLD AUTO: 0.8 % (ref 0–1.5)
BILIRUB SERPL-MCNC: 0.4 MG/DL (ref 0–1.2)
BILIRUB UR QL STRIP: NEGATIVE
BUN SERPL-MCNC: 15 MG/DL (ref 6–20)
BUN/CREAT SERPL: 16 (ref 7–25)
CALCIUM SPEC-SCNC: 9.1 MG/DL (ref 8.6–10.5)
CHLORIDE SERPL-SCNC: 104 MMOL/L (ref 98–107)
CLARITY UR: CLEAR
CO2 SERPL-SCNC: 25.9 MMOL/L (ref 22–29)
COLOR UR: YELLOW
CREAT SERPL-MCNC: 0.94 MG/DL (ref 0.76–1.27)
D-LACTATE SERPL-SCNC: 0.8 MMOL/L (ref 0.5–2)
DEPRECATED RDW RBC AUTO: 54.8 FL (ref 37–54)
EGFRCR SERPLBLD CKD-EPI 2021: 96.3 ML/MIN/1.73
EOSINOPHIL # BLD AUTO: 0.37 10*3/MM3 (ref 0–0.4)
EOSINOPHIL NFR BLD AUTO: 7.5 % (ref 0.3–6.2)
ERYTHROCYTE [DISTWIDTH] IN BLOOD BY AUTOMATED COUNT: 15.2 % (ref 12.3–15.4)
GLOBULIN UR ELPH-MCNC: 4.4 GM/DL
GLUCOSE SERPL-MCNC: 107 MG/DL (ref 65–99)
GLUCOSE UR STRIP-MCNC: NEGATIVE MG/DL
HCT VFR BLD AUTO: 43.4 % (ref 37.5–51)
HGB BLD-MCNC: 14.2 G/DL (ref 13–17.7)
HGB UR QL STRIP.AUTO: NEGATIVE
HOLD SPECIMEN: NORMAL
HOLD SPECIMEN: NORMAL
HYALINE CASTS UR QL AUTO: ABNORMAL /LPF
IMM GRANULOCYTES # BLD AUTO: 0.04 10*3/MM3 (ref 0–0.05)
IMM GRANULOCYTES NFR BLD AUTO: 0.8 % (ref 0–0.5)
INR PPP: 0.98 (ref 0.86–1.15)
KETONES UR QL STRIP: NEGATIVE
LEUKOCYTE ESTERASE UR QL STRIP.AUTO: ABNORMAL
LIPASE SERPL-CCNC: 40 U/L (ref 13–60)
LYMPHOCYTES # BLD AUTO: 1.16 10*3/MM3 (ref 0.7–3.1)
LYMPHOCYTES NFR BLD AUTO: 23.5 % (ref 19.6–45.3)
MCH RBC QN AUTO: 31.6 PG (ref 26.6–33)
MCHC RBC AUTO-ENTMCNC: 32.7 G/DL (ref 31.5–35.7)
MCV RBC AUTO: 96.7 FL (ref 79–97)
MONOCYTES # BLD AUTO: 0.68 10*3/MM3 (ref 0.1–0.9)
MONOCYTES NFR BLD AUTO: 13.8 % (ref 5–12)
NEUTROPHILS NFR BLD AUTO: 2.65 10*3/MM3 (ref 1.7–7)
NEUTROPHILS NFR BLD AUTO: 53.6 % (ref 42.7–76)
NITRITE UR QL STRIP: NEGATIVE
NRBC BLD AUTO-RTO: 0 /100 WBC (ref 0–0.2)
PH UR STRIP.AUTO: 7 [PH] (ref 5–8)
PLATELET # BLD AUTO: 117 10*3/MM3 (ref 140–450)
PMV BLD AUTO: 12.1 FL (ref 6–12)
POTASSIUM SERPL-SCNC: 4.3 MMOL/L (ref 3.5–5.2)
PROT SERPL-MCNC: 8.4 G/DL (ref 6–8.5)
PROT UR QL STRIP: NEGATIVE
PROTHROMBIN TIME: 13.1 SECONDS (ref 11.8–14.9)
RBC # BLD AUTO: 4.49 10*6/MM3 (ref 4.14–5.8)
RBC # UR STRIP: ABNORMAL /HPF
REF LAB TEST METHOD: ABNORMAL
SODIUM SERPL-SCNC: 138 MMOL/L (ref 136–145)
SP GR UR STRIP: 1.01 (ref 1–1.03)
SQUAMOUS #/AREA URNS HPF: ABNORMAL /HPF
UROBILINOGEN UR QL STRIP: ABNORMAL
WBC # UR STRIP: ABNORMAL /HPF
WBC NRBC COR # BLD: 4.94 10*3/MM3 (ref 3.4–10.8)
WHOLE BLOOD HOLD COAG: NORMAL
WHOLE BLOOD HOLD SPECIMEN: NORMAL

## 2023-01-16 PROCEDURE — 96374 THER/PROPH/DIAG INJ IV PUSH: CPT

## 2023-01-16 PROCEDURE — 25010000002 FUROSEMIDE PER 20 MG: Performed by: EMERGENCY MEDICINE

## 2023-01-16 PROCEDURE — 85730 THROMBOPLASTIN TIME PARTIAL: CPT | Performed by: NURSE PRACTITIONER

## 2023-01-16 PROCEDURE — 80053 COMPREHEN METABOLIC PANEL: CPT

## 2023-01-16 PROCEDURE — 99283 EMERGENCY DEPT VISIT LOW MDM: CPT

## 2023-01-16 PROCEDURE — 85025 COMPLETE CBC W/AUTO DIFF WBC: CPT

## 2023-01-16 PROCEDURE — 36415 COLL VENOUS BLD VENIPUNCTURE: CPT

## 2023-01-16 PROCEDURE — 83605 ASSAY OF LACTIC ACID: CPT

## 2023-01-16 PROCEDURE — 83690 ASSAY OF LIPASE: CPT

## 2023-01-16 PROCEDURE — 81001 URINALYSIS AUTO W/SCOPE: CPT

## 2023-01-16 PROCEDURE — 71045 X-RAY EXAM CHEST 1 VIEW: CPT

## 2023-01-16 PROCEDURE — 85610 PROTHROMBIN TIME: CPT | Performed by: NURSE PRACTITIONER

## 2023-01-16 RX ORDER — SODIUM CHLORIDE 0.9 % (FLUSH) 0.9 %
10 SYRINGE (ML) INJECTION AS NEEDED
Status: DISCONTINUED | OUTPATIENT
Start: 2023-01-16 | End: 2023-01-16 | Stop reason: HOSPADM

## 2023-01-16 RX ORDER — FUROSEMIDE 10 MG/ML
80 INJECTION INTRAMUSCULAR; INTRAVENOUS ONCE
Status: COMPLETED | OUTPATIENT
Start: 2023-01-16 | End: 2023-01-16

## 2023-01-16 RX ADMIN — FUROSEMIDE 80 MG: 10 INJECTION, SOLUTION INTRAMUSCULAR; INTRAVENOUS at 18:51

## 2023-01-16 NOTE — ED PROVIDER NOTES
Time: 5:57 PM EST  Date of encounter:  1/16/2023  Independent Historian/Clinical History and Information was obtained by:   Patient  Chief Complaint: abdominal pain    History is limited by: N/A    History of Present Illness:  Patient is a 52 y.o. year old male who presents to the emergency department for evaluation of abdominal pain. Patient has a medical history of alcohol liver cirrhosis and chronic abdominal distension. He states that the swelling has gradually gotten worse over the last two weeks. Denies fever. Denies shortness of breath  Patient Care Team  Primary Care Provider: Provider, Raquel Known    Past Medical History:     Allergies   Allergen Reactions   • Penicillins Hives     Past Medical History:   Diagnosis Date   • Cirrhosis of liver (HCC)    • COPD (chronic obstructive pulmonary disease) (HCC)    • Hypertension      Past Surgical History:   Procedure Laterality Date   • PARACENTESIS      abd     No family history on file.    Home Medications:  Prior to Admission medications    Medication Sig Start Date End Date Taking? Authorizing Provider   amLODIPine (NORVASC) 5 MG tablet Take 5 mg by mouth Daily.    Mila Wooten MD   cyclobenzaprine (FLEXERIL) 10 MG tablet Take 10 mg by mouth 2 (Two) Times a Day As Needed for Muscle Spasms.    Mila Wooten MD   escitalopram (LEXAPRO) 10 MG tablet Take 10 mg by mouth Daily.    Mila Wooten MD   furosemide (LASIX) 40 MG tablet Take 40 mg by mouth 3 (Three) Times a Day.    Mila Wooten MD   gabapentin (NEURONTIN) 300 MG capsule Take 300 mg by mouth 3 (Three) Times a Day.    Mila Wooten MD   naproxen (NAPROSYN) 500 MG tablet Take 500 mg by mouth 2 (Two) Times a Day With Meals.    Mila Wooten MD   predniSONE (DELTASONE) 50 MG tablet Take 1 tablet by mouth Daily. 1/1/23   Ravi Jarvis MD   QUEtiapine XR (SEROquel XR) 200 MG 24 hr tablet Take 200 mg by mouth Every Night.    Mila Wooten MD        Social  "History:   Social History     Tobacco Use   • Smoking status: Every Day     Packs/day: 1.00     Types: Cigarettes   • Smokeless tobacco: Never   Vaping Use   • Vaping Use: Never used   Substance Use Topics   • Alcohol use: Not Currently     Comment: recovering alcholic   • Drug use: Not Currently         Review of Systems:  Review of Systems   Constitutional: Negative for activity change, chills, fatigue and unexpected weight change.   HENT: Negative for congestion, sinus pressure, sore throat and trouble swallowing.    Eyes: Negative for pain, discharge, redness and visual disturbance.   Respiratory: Negative for cough, chest tightness, shortness of breath and wheezing.    Cardiovascular: Positive for leg swelling. Negative for chest pain and palpitations.   Gastrointestinal: Positive for abdominal distention. Negative for abdominal pain, diarrhea, nausea and vomiting.   Endocrine: Negative for cold intolerance and polydipsia.   Genitourinary: Negative for dysuria, frequency, hematuria and urgency.   Musculoskeletal: Negative for arthralgias, joint swelling, neck pain and neck stiffness.   Skin: Negative for color change and rash.   Allergic/Immunologic: Negative for environmental allergies and immunocompromised state.   Neurological: Negative for dizziness, weakness and light-headedness.   Hematological: Does not bruise/bleed easily.   Psychiatric/Behavioral: Negative for agitation, confusion, dysphoric mood and suicidal ideas.        Physical Exam:  /84 (BP Location: Left arm, Patient Position: Sitting)   Pulse 84   Temp 98.1 °F (36.7 °C) (Oral)   Resp 18   Ht 172.7 cm (68\")   Wt 126 kg (277 lb 1.9 oz)   SpO2 98%   BMI 42.14 kg/m²     Physical Exam  Vitals and nursing note reviewed.   Constitutional:       General: He is not in acute distress.  HENT:      Head: Normocephalic and atraumatic.      Right Ear: External ear normal.      Left Ear: External ear normal.      Nose: Nose normal.      " Mouth/Throat:      Mouth: Mucous membranes are moist.   Eyes:      Extraocular Movements: Extraocular movements intact.      Conjunctiva/sclera: Conjunctivae normal.      Pupils: Pupils are equal, round, and reactive to light.   Cardiovascular:      Rate and Rhythm: Normal rate and regular rhythm.      Pulses: Normal pulses.      Heart sounds: Normal heart sounds.   Pulmonary:      Effort: Pulmonary effort is normal.      Breath sounds: Examination of the right-upper field reveals wheezing. Examination of the left-upper field reveals wheezing. Examination of the right-middle field reveals wheezing. Examination of the left-middle field reveals wheezing. Examination of the right-lower field reveals wheezing. Examination of the left-lower field reveals wheezing. Wheezing present.   Abdominal:      General: Abdomen is protuberant. Bowel sounds are normal. There is no distension.      Palpations: Abdomen is soft.      Tenderness: There is no abdominal tenderness.      Comments: The patient has no abdominal tenderness but does have obvious ascites.   Musculoskeletal:         General: Normal range of motion.      Cervical back: Neck supple.      Right lower leg: 3+ Edema present.      Left lower leg: 3+ Edema present.   Neurological:      Mental Status: He is alert and oriented to person, place, and time.   Psychiatric:         Mood and Affect: Mood normal.         Behavior: Behavior normal.                  Procedures:  Procedures      Medical Decision Making:      Comorbidities that affect care:    Cirrhosis    External Notes reviewed:    Previous ED Note      The following orders were placed and all results were independently analyzed by me:  Orders Placed This Encounter   Procedures   • XR Chest 1 View   • [place] Draw   • Comprehensive Metabolic Panel   • Lipase   • Urinalysis With Microscopic If Indicated (No Culture) - Urine, Clean Catch   • Lactic Acid, Plasma   • CBC Auto Differential   • Urinalysis, Microscopic  Only - Urine, Clean Catch   • Protime-INR   • aPTT   • Undress & Gown   • CBC & Differential   • Green Top (Gel)   • Lavender Top   • Gold Top - SST   • Light Blue Top       Medications Given in the Emergency Department:  Medications   furosemide (LASIX) injection 80 mg (80 mg Intravenous Given 1/16/23 1851)        ED Course:         Labs:    Lab Results (last 24 hours)     Procedure Component Value Units Date/Time    CBC & Differential [[id number]]  (Abnormal) Collected: 01/16/23 1237    Specimen: Blood Updated: 01/16/23 1309    Narrative:      The following orders were created for panel order CBC & Differential.  Procedure                               Abnormality         Status                     ---------                               -----------         ------                     CBC Auto Differential[[id number]]        Abnormal            Final result               Scan Slide[[id number]]                                                                    Please view results for these tests on the individual orders.    Comprehensive Metabolic Panel [[id number]]  (Abnormal) Collected: 01/16/23 1237    Specimen: Blood Updated: 01/16/23 1313     Glucose 107 mg/dL      BUN 15 mg/dL      Creatinine 0.94 mg/dL      Sodium 138 mmol/L      Potassium 4.3 mmol/L      Chloride 104 mmol/L      CO2 25.9 mmol/L      Calcium 9.1 mg/dL      Total Protein 8.4 g/dL      Albumin 4.0 g/dL      ALT (SGPT) 29 U/L      AST (SGOT) 33 U/L      Alkaline Phosphatase 132 U/L      Total Bilirubin 0.4 mg/dL      Globulin 4.4 gm/dL      A/G Ratio 0.9 g/dL      BUN/Creatinine Ratio 16.0     Anion Gap 8.1 mmol/L      eGFR 96.3 mL/min/1.73      Comment: National Kidney Foundation and American Society of Nephrology (ASN) Task Force recommended calculation based on the Chronic Kidney Disease Epidemiology Collaboration (CKD-EPI) equation refit without adjustment for race.       Narrative:      GFR Normal >60  Chronic Kidney Disease <60  Kidney Failure  <15      Lipase [[id number]]  (Normal) Collected: 01/16/23 1237    Specimen: Blood Updated: 01/16/23 1313     Lipase 40 U/L     Lactic Acid, Plasma [[id number]]  (Normal) Collected: 01/16/23 1237    Specimen: Blood Updated: 01/16/23 1311     Lactate 0.8 mmol/L     CBC Auto Differential [[id number]]  (Abnormal) Collected: 01/16/23 1237    Specimen: Blood Updated: 01/16/23 1309     WBC 4.94 10*3/mm3      RBC 4.49 10*6/mm3      Hemoglobin 14.2 g/dL      Hematocrit 43.4 %      MCV 96.7 fL      MCH 31.6 pg      MCHC 32.7 g/dL      RDW 15.2 %      RDW-SD 54.8 fl      MPV 12.1 fL      Platelets 117 10*3/mm3      Neutrophil % 53.6 %      Lymphocyte % 23.5 %      Monocyte % 13.8 %      Eosinophil % 7.5 %      Basophil % 0.8 %      Immature Grans % 0.8 %      Neutrophils, Absolute 2.65 10*3/mm3      Lymphocytes, Absolute 1.16 10*3/mm3      Monocytes, Absolute 0.68 10*3/mm3      Eosinophils, Absolute 0.37 10*3/mm3      Basophils, Absolute 0.04 10*3/mm3      Immature Grans, Absolute 0.04 10*3/mm3      nRBC 0.0 /100 WBC     Protime-INR [[id number]]  (Normal) Collected: 01/16/23 1237    Specimen: Blood Updated: 01/16/23 1550     Protime 13.1 Seconds      INR 0.98    Narrative:      Suggested Therapeutic Ranges For Oral Anticoagulant Therapy:  Level of Therapy                      INR Target Range  Standard Dose                            2.0-3.0  High Dose                                2.5-3.5  Patients not receiving anticoagulant  Therapy Normal Range                     0.86-1.15    aPTT [[id number]]  (Normal) Collected: 01/16/23 1237    Specimen: Blood Updated: 01/16/23 1550     PTT 34.1 seconds     Urinalysis With Microscopic If Indicated (No Culture) - Urine, Clean Catch [[id number]]  (Abnormal) Collected: 01/16/23 1239    Specimen: Urine, Clean Catch Updated: 01/16/23 1300     Color, UA Yellow     Appearance, UA Clear     pH, UA 7.0     Specific Gravity, UA 1.015     Glucose, UA Negative     Ketones, UA Negative     Bilirubin,  UA Negative     Blood, UA Negative     Protein, UA Negative     Leuk Esterase, UA Trace     Nitrite, UA Negative     Urobilinogen, UA 1.0 E.U./dL    Urinalysis, Microscopic Only - Urine, Clean Catch [[id number]]  (Abnormal) Collected: 01/16/23 1239    Specimen: Urine, Clean Catch Updated: 01/16/23 1300     RBC, UA 0-2 /HPF      WBC, UA 3-5 /HPF      Bacteria, UA None Seen /HPF      Squamous Epithelial Cells, UA 0-2 /HPF      Hyaline Casts, UA None Seen /LPF      Methodology Automated Microscopy           Imaging:    XR Chest 1 View    Result Date: 1/16/2023  PROCEDURE: XR CHEST 1 VW  COMPARISON: [facility], CR, XR CHEST 1 VW, 1/01/2023, 16:53.  INDICATIONS: SOB  FINDINGS:  The heart is enlarged.  Pulmonary vascular markings are normal.  There are no focal consolidations.  The osseous structures are normal.       No active disease  MARIE KLEIN MD       Electronically Signed and Approved By: MARIE KLEIN MD on 1/16/2023 at 16:46                 Differential Diagnosis and Discussion:    Edema-liver failure, kidney failure, congestive heart failure, cirrhosis, ascites.    All labs were reviewed and analyzed by me.    MDM  Number of Diagnoses or Management Options  Ascites due to alcoholic cirrhosis (HCC)  Cirrhosis of liver with ascites, unspecified hepatic cirrhosis type (HCC)  Diagnosis management comments: I discussed the possibility of doing a paracentesis in the emergency department however the patient would like to just attempt to increase his diuretics at this point.  He does not want to have paracentesis performed.       Amount and/or Complexity of Data Reviewed  Clinical lab tests: reviewed  Tests in the radiology section of CPT®: reviewed  Decide to obtain previous medical records or to obtain history from someone other than the patient: yes  Review and summarize past medical records: yes  Independent visualization of images, tracings, or specimens: yes             Patient Care  Considerations:    CT ABDOMEN AND PELVIS: I considered ordering a CT scan of the abdomen and pelvis however The patient has no abdominal pain or tenderness he has no signs of infection.      Consultants/Shared Management Plan:    None    Social Determinants of Health:    Patient is independent, reliable, and has access to care.       Disposition and Care Coordination:    Discharged: The patient is suitable and stable for discharge with no need for consideration of observation or admission.    I have explained discharge medications and the need for follow up with the patient/caretakers. This was also printed in the discharge instructions. Patient was discharged with the following medications and follow up:      Medication List      No changes were made to your prescriptions during this visit.      Your primary care provider    In 2 days  If no better.       Final diagnoses:   Cirrhosis of liver with ascites, unspecified hepatic cirrhosis type (HCC)   Ascites due to alcoholic cirrhosis (HCC)        ED Disposition     ED Disposition   Discharge    Condition   Stable    Comment   Patient discharge discussed with patient at the bedside             This medical record created using voice recognition software.           Agustin Medina  01/16/23 3016       Patrick Vallejo DO  01/16/23 2819

## 2023-01-16 NOTE — DISCHARGE INSTRUCTIONS
Increase your Lasix to 80 mg twice daily.  Restrict your fluid intake.  Return for worsening symptoms.  Follow-up with your doctor in 2 days if no better.

## 2023-02-16 ENCOUNTER — HOSPITAL ENCOUNTER (INPATIENT)
Facility: HOSPITAL | Age: 53
LOS: 1 days | Discharge: LEFT BEFORE DISCHARGE | DRG: 190 | End: 2023-02-17
Attending: EMERGENCY MEDICINE | Admitting: FAMILY MEDICINE
Payer: MEDICARE

## 2023-02-16 ENCOUNTER — APPOINTMENT (OUTPATIENT)
Dept: GENERAL RADIOLOGY | Facility: HOSPITAL | Age: 53
DRG: 190 | End: 2023-02-16
Payer: MEDICARE

## 2023-02-16 ENCOUNTER — APPOINTMENT (OUTPATIENT)
Dept: CT IMAGING | Facility: HOSPITAL | Age: 53
DRG: 190 | End: 2023-02-16
Payer: MEDICARE

## 2023-02-16 DIAGNOSIS — R26.2 DIFFICULTY WALKING: ICD-10-CM

## 2023-02-16 DIAGNOSIS — J96.02 ACUTE RESPIRATORY FAILURE WITH HYPERCAPNIA: ICD-10-CM

## 2023-02-16 DIAGNOSIS — T88.7XXA SIDE EFFECT OF MEDICATION: ICD-10-CM

## 2023-02-16 DIAGNOSIS — R41.82 ALTERED MENTAL STATUS, UNSPECIFIED ALTERED MENTAL STATUS TYPE: Primary | ICD-10-CM

## 2023-02-16 DIAGNOSIS — I50.9 ACUTE ON CHRONIC CONGESTIVE HEART FAILURE, UNSPECIFIED HEART FAILURE TYPE: ICD-10-CM

## 2023-02-16 PROBLEM — J96.22 ACUTE ON CHRONIC RESPIRATORY FAILURE WITH HYPERCAPNIA: Status: ACTIVE | Noted: 2023-02-16

## 2023-02-16 LAB
ALBUMIN SERPL-MCNC: 4.1 G/DL (ref 3.5–5.2)
ALBUMIN/GLOB SERPL: 0.9 G/DL
ALP SERPL-CCNC: 117 U/L (ref 39–117)
ALT SERPL W P-5'-P-CCNC: 21 U/L (ref 1–41)
AMMONIA BLD-SCNC: 54 UMOL/L (ref 16–60)
AMPHET+METHAMPHET UR QL: NEGATIVE
ANION GAP SERPL CALCULATED.3IONS-SCNC: 10 MMOL/L (ref 5–15)
ANION GAP SERPL CALCULATED.3IONS-SCNC: 8.2 MMOL/L (ref 5–15)
APTT PPP: 34.5 SECONDS (ref 78–95.9)
ARTERIAL PATENCY WRIST A: ABNORMAL
ARTERIAL PATENCY WRIST A: POSITIVE
AST SERPL-CCNC: 21 U/L (ref 1–40)
BARBITURATES UR QL SCN: NEGATIVE
BASE EXCESS BLDA CALC-SCNC: -0.3 MMOL/L (ref -2–2)
BASE EXCESS BLDA CALC-SCNC: -2.3 MMOL/L (ref -2–2)
BASE EXCESS BLDA CALC-SCNC: -6 MMOL/L (ref -2–2)
BASOPHILS # BLD AUTO: 0.04 10*3/MM3 (ref 0–0.2)
BASOPHILS # BLD AUTO: 0.06 10*3/MM3 (ref 0–0.2)
BASOPHILS NFR BLD AUTO: 0.6 % (ref 0–1.5)
BASOPHILS NFR BLD AUTO: 0.7 % (ref 0–1.5)
BDY SITE: ABNORMAL
BENZODIAZ UR QL SCN: NEGATIVE
BILIRUB SERPL-MCNC: 0.4 MG/DL (ref 0–1.2)
BUN SERPL-MCNC: 14 MG/DL (ref 6–20)
BUN SERPL-MCNC: 16 MG/DL (ref 6–20)
BUN/CREAT SERPL: 12.1 (ref 7–25)
BUN/CREAT SERPL: 13.7 (ref 7–25)
CA-I BLDA-SCNC: 1.16 MMOL/L (ref 1.13–1.32)
CALCIUM SPEC-SCNC: 9.1 MG/DL (ref 8.6–10.5)
CALCIUM SPEC-SCNC: 9.1 MG/DL (ref 8.6–10.5)
CANNABINOIDS SERPL QL: NEGATIVE
CHLORIDE BLDA-SCNC: 101 MMOL/L (ref 98–106)
CHLORIDE SERPL-SCNC: 100 MMOL/L (ref 98–107)
CHLORIDE SERPL-SCNC: 100 MMOL/L (ref 98–107)
CO2 SERPL-SCNC: 26 MMOL/L (ref 22–29)
CO2 SERPL-SCNC: 26.8 MMOL/L (ref 22–29)
COCAINE UR QL: NEGATIVE
COHGB MFR BLD: 1.7 % (ref 0–1.5)
COHGB MFR BLD: 2.9 % (ref 0–1.5)
COHGB MFR BLD: 3.2 % (ref 0–1.5)
CREAT SERPL-MCNC: 1.16 MG/DL (ref 0.76–1.27)
CREAT SERPL-MCNC: 1.17 MG/DL (ref 0.76–1.27)
DEPRECATED RDW RBC AUTO: 49.4 FL (ref 37–54)
DEPRECATED RDW RBC AUTO: 51.2 FL (ref 37–54)
EGFRCR SERPLBLD CKD-EPI 2021: 73.6 ML/MIN/1.73
EGFRCR SERPLBLD CKD-EPI 2021: 74.4 ML/MIN/1.73
EOSINOPHIL # BLD AUTO: 0.36 10*3/MM3 (ref 0–0.4)
EOSINOPHIL # BLD AUTO: 0.38 10*3/MM3 (ref 0–0.4)
EOSINOPHIL NFR BLD AUTO: 4.7 % (ref 0.3–6.2)
EOSINOPHIL NFR BLD AUTO: 5.3 % (ref 0.3–6.2)
ERYTHROCYTE [DISTWIDTH] IN BLOOD BY AUTOMATED COUNT: 14.1 % (ref 12.3–15.4)
ERYTHROCYTE [DISTWIDTH] IN BLOOD BY AUTOMATED COUNT: 14.1 % (ref 12.3–15.4)
FHHB: 3.6 % (ref 0–5)
FHHB: 5 % (ref 0–5)
FHHB: 7 % (ref 0–5)
GAS FLOW AIRWAY: 2 LPM
GAS FLOW AIRWAY: ABNORMAL L/MIN
GEN 5 2HR TROPONIN T REFLEX: 7 NG/L
GLOBULIN UR ELPH-MCNC: 4.7 GM/DL
GLUCOSE BLDA-MCNC: 118 MG/DL (ref 70–99)
GLUCOSE SERPL-MCNC: 108 MG/DL (ref 65–99)
GLUCOSE SERPL-MCNC: 127 MG/DL (ref 65–99)
HCO3 BLDA-SCNC: 23.4 MMOL/L (ref 22–26)
HCO3 BLDA-SCNC: 26.5 MMOL/L (ref 22–26)
HCO3 BLDA-SCNC: 28.1 MMOL/L (ref 22–26)
HCT VFR BLD AUTO: 43.9 % (ref 37.5–51)
HCT VFR BLD AUTO: 45.6 % (ref 37.5–51)
HGB BLD-MCNC: 14.2 G/DL (ref 13–17.7)
HGB BLD-MCNC: 14.4 G/DL (ref 13–17.7)
HGB BLDA-MCNC: 15 G/DL (ref 13.8–16.4)
HGB BLDA-MCNC: 15.5 G/DL (ref 13.8–16.4)
HGB BLDA-MCNC: 15.8 G/DL (ref 13.8–16.4)
HOLD SPECIMEN: NORMAL
HOLD SPECIMEN: NORMAL
IMM GRANULOCYTES # BLD AUTO: 0.06 10*3/MM3 (ref 0–0.05)
IMM GRANULOCYTES # BLD AUTO: 0.07 10*3/MM3 (ref 0–0.05)
IMM GRANULOCYTES NFR BLD AUTO: 0.9 % (ref 0–0.5)
IMM GRANULOCYTES NFR BLD AUTO: 0.9 % (ref 0–0.5)
INHALED O2 CONCENTRATION: 28 %
INHALED O2 CONCENTRATION: 32 %
INHALED O2 CONCENTRATION: 36 %
INR PPP: 1.01 (ref 0.86–1.15)
LACTATE BLDA-SCNC: 0.9 MMOL/L (ref 0.5–2)
LACTATE BLDA-SCNC: 1.02 MMOL/L (ref 0.5–2)
LACTATE BLDA-SCNC: ABNORMAL MMOL/L
LYMPHOCYTES # BLD AUTO: 1.61 10*3/MM3 (ref 0.7–3.1)
LYMPHOCYTES # BLD AUTO: 1.79 10*3/MM3 (ref 0.7–3.1)
LYMPHOCYTES NFR BLD AUTO: 19.8 % (ref 19.6–45.3)
LYMPHOCYTES NFR BLD AUTO: 26.4 % (ref 19.6–45.3)
MCH RBC QN AUTO: 31 PG (ref 26.6–33)
MCH RBC QN AUTO: 31.1 PG (ref 26.6–33)
MCHC RBC AUTO-ENTMCNC: 31.6 G/DL (ref 31.5–35.7)
MCHC RBC AUTO-ENTMCNC: 32.3 G/DL (ref 31.5–35.7)
MCV RBC AUTO: 96.1 FL (ref 79–97)
MCV RBC AUTO: 98.1 FL (ref 79–97)
METHADONE UR QL SCN: NEGATIVE
METHGB BLD QL: 0.2 % (ref 0–1.5)
MODALITY: ABNORMAL
MONOCYTES # BLD AUTO: 1.08 10*3/MM3 (ref 0.1–0.9)
MONOCYTES # BLD AUTO: 1.14 10*3/MM3 (ref 0.1–0.9)
MONOCYTES NFR BLD AUTO: 14 % (ref 5–12)
MONOCYTES NFR BLD AUTO: 15.9 % (ref 5–12)
NEUTROPHILS NFR BLD AUTO: 3.45 10*3/MM3 (ref 1.7–7)
NEUTROPHILS NFR BLD AUTO: 4.89 10*3/MM3 (ref 1.7–7)
NEUTROPHILS NFR BLD AUTO: 50.9 % (ref 42.7–76)
NEUTROPHILS NFR BLD AUTO: 59.9 % (ref 42.7–76)
NOTE: ABNORMAL
NOTE: ABNORMAL
NRBC BLD AUTO-RTO: 0 /100 WBC (ref 0–0.2)
NRBC BLD AUTO-RTO: 0 /100 WBC (ref 0–0.2)
NT-PROBNP SERPL-MCNC: <36 PG/ML (ref 0–900)
OPIATES UR QL: NEGATIVE
OXYCODONE UR QL SCN: NEGATIVE
OXYHGB MFR BLDV: 89.6 % (ref 94–99)
OXYHGB MFR BLDV: 91.9 % (ref 94–99)
OXYHGB MFR BLDV: 94.5 % (ref 94–99)
PCO2 BLDA: 62.2 MM HG (ref 35–45)
PCO2 BLDA: 62.5 MM HG (ref 35–45)
PCO2 BLDA: 62.9 MM HG (ref 35–45)
PH BLDA: 7.19 PH UNITS (ref 7.35–7.45)
PH BLDA: 7.25 PH UNITS (ref 7.35–7.45)
PH BLDA: 7.27 PH UNITS (ref 7.35–7.45)
PLATELET # BLD AUTO: 163 10*3/MM3 (ref 140–450)
PLATELET # BLD AUTO: 165 10*3/MM3 (ref 140–450)
PMV BLD AUTO: 11.3 FL (ref 6–12)
PMV BLD AUTO: 11.8 FL (ref 6–12)
PO2 BLD: 256 MM[HG] (ref 0–500)
PO2 BLD: 263 MM[HG] (ref 0–500)
PO2 BLD: 272 MM[HG] (ref 0–500)
PO2 BLDA: 76.2 MM HG (ref 80–100)
PO2 BLDA: 81.9 MM HG (ref 80–100)
PO2 BLDA: 94.8 MM HG (ref 80–100)
POTASSIUM BLDA-SCNC: 4.91 MMOL/L (ref 3.5–5)
POTASSIUM SERPL-SCNC: 4.4 MMOL/L (ref 3.5–5.2)
POTASSIUM SERPL-SCNC: 4.5 MMOL/L (ref 3.5–5.2)
PROT SERPL-MCNC: 8.8 G/DL (ref 6–8.5)
PROTHROMBIN TIME: 13.4 SECONDS (ref 11.8–14.9)
RBC # BLD AUTO: 4.57 10*6/MM3 (ref 4.14–5.8)
RBC # BLD AUTO: 4.65 10*6/MM3 (ref 4.14–5.8)
SAO2 % BLDCOA: 92.8 % (ref 95–99)
SAO2 % BLDCOA: 94.8 % (ref 95–99)
SAO2 % BLDCOA: 96.3 % (ref 95–99)
SET MECH RESP RATE: 22
SODIUM BLDA-SCNC: 137.6 MMOL/L (ref 136–146)
SODIUM SERPL-SCNC: 135 MMOL/L (ref 136–145)
SODIUM SERPL-SCNC: 136 MMOL/L (ref 136–145)
TROPONIN T DELTA: -1 NG/L
TROPONIN T SERPL HS-MCNC: 8 NG/L
WBC NRBC COR # BLD: 6.78 10*3/MM3 (ref 3.4–10.8)
WBC NRBC COR # BLD: 8.15 10*3/MM3 (ref 3.4–10.8)
WHOLE BLOOD HOLD COAG: NORMAL
WHOLE BLOOD HOLD SPECIMEN: NORMAL

## 2023-02-16 PROCEDURE — 25010000002 NALOXONE PER 1 MG

## 2023-02-16 PROCEDURE — 36600 WITHDRAWAL OF ARTERIAL BLOOD: CPT | Performed by: FAMILY MEDICINE

## 2023-02-16 PROCEDURE — 80307 DRUG TEST PRSMV CHEM ANLYZR: CPT | Performed by: EMERGENCY MEDICINE

## 2023-02-16 PROCEDURE — 82805 BLOOD GASES W/O2 SATURATION: CPT | Performed by: EMERGENCY MEDICINE

## 2023-02-16 PROCEDURE — 36600 WITHDRAWAL OF ARTERIAL BLOOD: CPT | Performed by: STUDENT IN AN ORGANIZED HEALTH CARE EDUCATION/TRAINING PROGRAM

## 2023-02-16 PROCEDURE — 93005 ELECTROCARDIOGRAM TRACING: CPT

## 2023-02-16 PROCEDURE — 85025 COMPLETE CBC W/AUTO DIFF WBC: CPT

## 2023-02-16 PROCEDURE — 99285 EMERGENCY DEPT VISIT HI MDM: CPT

## 2023-02-16 PROCEDURE — 82805 BLOOD GASES W/O2 SATURATION: CPT | Performed by: STUDENT IN AN ORGANIZED HEALTH CARE EDUCATION/TRAINING PROGRAM

## 2023-02-16 PROCEDURE — 71045 X-RAY EXAM CHEST 1 VIEW: CPT

## 2023-02-16 PROCEDURE — 82805 BLOOD GASES W/O2 SATURATION: CPT | Performed by: FAMILY MEDICINE

## 2023-02-16 PROCEDURE — 82375 ASSAY CARBOXYHB QUANT: CPT | Performed by: FAMILY MEDICINE

## 2023-02-16 PROCEDURE — 94640 AIRWAY INHALATION TREATMENT: CPT

## 2023-02-16 PROCEDURE — 82375 ASSAY CARBOXYHB QUANT: CPT | Performed by: EMERGENCY MEDICINE

## 2023-02-16 PROCEDURE — 94660 CPAP INITIATION&MGMT: CPT

## 2023-02-16 PROCEDURE — 93005 ELECTROCARDIOGRAM TRACING: CPT | Performed by: EMERGENCY MEDICINE

## 2023-02-16 PROCEDURE — 25010000002 FUROSEMIDE PER 20 MG: Performed by: EMERGENCY MEDICINE

## 2023-02-16 PROCEDURE — 94799 UNLISTED PULMONARY SVC/PX: CPT

## 2023-02-16 PROCEDURE — 84484 ASSAY OF TROPONIN QUANT: CPT

## 2023-02-16 PROCEDURE — 86738 MYCOPLASMA ANTIBODY: CPT | Performed by: FAMILY MEDICINE

## 2023-02-16 PROCEDURE — 85730 THROMBOPLASTIN TIME PARTIAL: CPT | Performed by: STUDENT IN AN ORGANIZED HEALTH CARE EDUCATION/TRAINING PROGRAM

## 2023-02-16 PROCEDURE — 83880 ASSAY OF NATRIURETIC PEPTIDE: CPT

## 2023-02-16 PROCEDURE — 36415 COLL VENOUS BLD VENIPUNCTURE: CPT

## 2023-02-16 PROCEDURE — 85610 PROTHROMBIN TIME: CPT | Performed by: STUDENT IN AN ORGANIZED HEALTH CARE EDUCATION/TRAINING PROGRAM

## 2023-02-16 PROCEDURE — 80053 COMPREHEN METABOLIC PANEL: CPT

## 2023-02-16 PROCEDURE — 70450 CT HEAD/BRAIN W/O DYE: CPT

## 2023-02-16 PROCEDURE — 83050 HGB METHEMOGLOBIN QUAN: CPT | Performed by: FAMILY MEDICINE

## 2023-02-16 PROCEDURE — 36600 WITHDRAWAL OF ARTERIAL BLOOD: CPT | Performed by: EMERGENCY MEDICINE

## 2023-02-16 PROCEDURE — 82375 ASSAY CARBOXYHB QUANT: CPT | Performed by: STUDENT IN AN ORGANIZED HEALTH CARE EDUCATION/TRAINING PROGRAM

## 2023-02-16 PROCEDURE — 84484 ASSAY OF TROPONIN QUANT: CPT | Performed by: EMERGENCY MEDICINE

## 2023-02-16 PROCEDURE — 99223 1ST HOSP IP/OBS HIGH 75: CPT | Performed by: FAMILY MEDICINE

## 2023-02-16 PROCEDURE — 85025 COMPLETE CBC W/AUTO DIFF WBC: CPT | Performed by: FAMILY MEDICINE

## 2023-02-16 PROCEDURE — 93010 ELECTROCARDIOGRAM REPORT: CPT | Performed by: INTERNAL MEDICINE

## 2023-02-16 PROCEDURE — 83050 HGB METHEMOGLOBIN QUAN: CPT | Performed by: STUDENT IN AN ORGANIZED HEALTH CARE EDUCATION/TRAINING PROGRAM

## 2023-02-16 PROCEDURE — 25010000002 HEPARIN (PORCINE) PER 1000 UNITS: Performed by: FAMILY MEDICINE

## 2023-02-16 PROCEDURE — 82140 ASSAY OF AMMONIA: CPT | Performed by: STUDENT IN AN ORGANIZED HEALTH CARE EDUCATION/TRAINING PROGRAM

## 2023-02-16 PROCEDURE — 83050 HGB METHEMOGLOBIN QUAN: CPT | Performed by: EMERGENCY MEDICINE

## 2023-02-16 RX ORDER — ACETAMINOPHEN 325 MG/1
650 TABLET ORAL EVERY 4 HOURS PRN
Status: DISCONTINUED | OUTPATIENT
Start: 2023-02-16 | End: 2023-02-17 | Stop reason: HOSPADM

## 2023-02-16 RX ORDER — IPRATROPIUM BROMIDE AND ALBUTEROL SULFATE 2.5; .5 MG/3ML; MG/3ML
3 SOLUTION RESPIRATORY (INHALATION) ONCE
Status: COMPLETED | OUTPATIENT
Start: 2023-02-16 | End: 2023-02-16

## 2023-02-16 RX ORDER — NALOXONE HCL 0.4 MG/ML
0.4 VIAL (ML) INJECTION ONCE
Status: COMPLETED | OUTPATIENT
Start: 2023-02-16 | End: 2023-02-16

## 2023-02-16 RX ORDER — METHYLPREDNISOLONE SODIUM SUCCINATE 125 MG/2ML
62.5 INJECTION, POWDER, LYOPHILIZED, FOR SOLUTION INTRAMUSCULAR; INTRAVENOUS DAILY
Status: DISCONTINUED | OUTPATIENT
Start: 2023-02-17 | End: 2023-02-17

## 2023-02-16 RX ORDER — SODIUM CHLORIDE 9 MG/ML
100 INJECTION, SOLUTION INTRAVENOUS CONTINUOUS
Status: DISCONTINUED | OUTPATIENT
Start: 2023-02-16 | End: 2023-02-17

## 2023-02-16 RX ORDER — DOXEPIN HYDROCHLORIDE 50 MG/1
50 CAPSULE ORAL
COMMUNITY
Start: 2023-02-06

## 2023-02-16 RX ORDER — SODIUM CHLORIDE 9 MG/ML
40 INJECTION, SOLUTION INTRAVENOUS AS NEEDED
Status: DISCONTINUED | OUTPATIENT
Start: 2023-02-16 | End: 2023-02-17 | Stop reason: HOSPADM

## 2023-02-16 RX ORDER — SODIUM CHLORIDE 0.9 % (FLUSH) 0.9 %
10 SYRINGE (ML) INJECTION EVERY 12 HOURS SCHEDULED
Status: DISCONTINUED | OUTPATIENT
Start: 2023-02-16 | End: 2023-02-17 | Stop reason: HOSPADM

## 2023-02-16 RX ORDER — SODIUM CHLORIDE 0.9 % (FLUSH) 0.9 %
10 SYRINGE (ML) INJECTION AS NEEDED
Status: DISCONTINUED | OUTPATIENT
Start: 2023-02-16 | End: 2023-02-17 | Stop reason: HOSPADM

## 2023-02-16 RX ORDER — SPIRONOLACTONE 50 MG/1
1 TABLET, FILM COATED ORAL 2 TIMES DAILY
COMMUNITY
Start: 2023-02-15

## 2023-02-16 RX ORDER — ESCITALOPRAM OXALATE 20 MG/1
20 TABLET ORAL EVERY MORNING
COMMUNITY
Start: 2023-02-06

## 2023-02-16 RX ORDER — QUETIAPINE FUMARATE 400 MG/1
400 TABLET, FILM COATED ORAL
COMMUNITY
Start: 2023-02-06

## 2023-02-16 RX ORDER — NITROGLYCERIN 0.4 MG/1
0.4 TABLET SUBLINGUAL
Status: DISCONTINUED | OUTPATIENT
Start: 2023-02-16 | End: 2023-02-17 | Stop reason: HOSPADM

## 2023-02-16 RX ORDER — ALBUTEROL SULFATE 2.5 MG/3ML
2.5 SOLUTION RESPIRATORY (INHALATION)
Status: DISCONTINUED | OUTPATIENT
Start: 2023-02-17 | End: 2023-02-17

## 2023-02-16 RX ORDER — FUROSEMIDE 10 MG/ML
40 INJECTION INTRAMUSCULAR; INTRAVENOUS ONCE
Status: DISCONTINUED | OUTPATIENT
Start: 2023-02-16 | End: 2023-02-16

## 2023-02-16 RX ORDER — GABAPENTIN 600 MG/1
1 TABLET ORAL 3 TIMES DAILY
COMMUNITY
Start: 2023-02-15

## 2023-02-16 RX ORDER — NALOXONE HYDROCHLORIDE 0.4 MG/ML
INJECTION, SOLUTION INTRAMUSCULAR; INTRAVENOUS; SUBCUTANEOUS
Status: COMPLETED
Start: 2023-02-16 | End: 2023-02-16

## 2023-02-16 RX ORDER — METOPROLOL SUCCINATE 50 MG/1
1 TABLET, EXTENDED RELEASE ORAL DAILY
COMMUNITY
Start: 2023-01-20

## 2023-02-16 RX ORDER — HEPARIN SODIUM 5000 [USP'U]/ML
5000 INJECTION, SOLUTION INTRAVENOUS; SUBCUTANEOUS EVERY 8 HOURS SCHEDULED
Status: DISCONTINUED | OUTPATIENT
Start: 2023-02-16 | End: 2023-02-17 | Stop reason: HOSPADM

## 2023-02-16 RX ORDER — ACETAMINOPHEN 160 MG/5ML
650 SOLUTION ORAL EVERY 4 HOURS PRN
Status: DISCONTINUED | OUTPATIENT
Start: 2023-02-16 | End: 2023-02-17 | Stop reason: HOSPADM

## 2023-02-16 RX ORDER — BUPRENORPHINE HYDROCHLORIDE AND NALOXONE HYDROCHLORIDE DIHYDRATE 8; 2 MG/1; MG/1
1 TABLET SUBLINGUAL DAILY
COMMUNITY
Start: 2023-02-15

## 2023-02-16 RX ORDER — FUROSEMIDE 10 MG/ML
60 INJECTION INTRAMUSCULAR; INTRAVENOUS ONCE
Status: COMPLETED | OUTPATIENT
Start: 2023-02-16 | End: 2023-02-16

## 2023-02-16 RX ORDER — POTASSIUM CHLORIDE 20 MEQ/1
1 TABLET, EXTENDED RELEASE ORAL 2 TIMES DAILY
COMMUNITY
Start: 2023-02-15

## 2023-02-16 RX ORDER — ACETAMINOPHEN 650 MG/1
650 SUPPOSITORY RECTAL EVERY 4 HOURS PRN
Status: DISCONTINUED | OUTPATIENT
Start: 2023-02-16 | End: 2023-02-17 | Stop reason: HOSPADM

## 2023-02-16 RX ADMIN — SODIUM CHLORIDE 100 ML/HR: 9 INJECTION, SOLUTION INTRAVENOUS at 22:48

## 2023-02-16 RX ADMIN — Medication 0.4 MG: at 15:43

## 2023-02-16 RX ADMIN — HEPARIN SODIUM 5000 UNITS: 5000 INJECTION INTRAVENOUS; SUBCUTANEOUS at 22:47

## 2023-02-16 RX ADMIN — NALOXONE HYDROCHLORIDE 0.4 MG: 0.4 INJECTION, SOLUTION INTRAMUSCULAR; INTRAVENOUS; SUBCUTANEOUS at 15:43

## 2023-02-16 RX ADMIN — FUROSEMIDE 60 MG: 10 INJECTION, SOLUTION INTRAMUSCULAR; INTRAVENOUS at 16:02

## 2023-02-16 RX ADMIN — Medication 10 ML: at 22:48

## 2023-02-16 RX ADMIN — IPRATROPIUM BROMIDE AND ALBUTEROL SULFATE 3 ML: .5; 2.5 SOLUTION RESPIRATORY (INHALATION) at 15:58

## 2023-02-16 NOTE — ED PROVIDER NOTES
Time: 3:47 PM EST  Date of encounter:  2/16/2023  Independent Historian/Clinical History and Information was obtained by:   Patient  Chief Complaint: SOB    History is limited by: Lethargy     History of Present Illness:  Patient is a 53 y.o. year old male who presents to the emergency department for evaluation of shortness of breath. Patient states he is falling asleep all of the time and has recently increased his Seroquel to 400 mg a night. Patient is concerned of sleep apnea. Patient has hx of COPD.       History provided by:  Patient  History limited by: Lethargic.   used: No        Patient Care Team  Primary Care Provider: Provider, Raquel Known    Past Medical History:     Allergies   Allergen Reactions   • Penicillins Hives     Past Medical History:   Diagnosis Date   • Cirrhosis of liver (HCC)    • COPD (chronic obstructive pulmonary disease) (HCC)    • Hypertension      Past Surgical History:   Procedure Laterality Date   • PARACENTESIS      abd     History reviewed. No pertinent family history.    Home Medications:  Prior to Admission medications    Medication Sig Start Date End Date Taking? Authorizing Provider   amLODIPine (NORVASC) 5 MG tablet Take 5 mg by mouth Daily.    Mila Wooten MD   cyclobenzaprine (FLEXERIL) 10 MG tablet Take 10 mg by mouth 2 (Two) Times a Day As Needed for Muscle Spasms.    Mila Wooten MD   escitalopram (LEXAPRO) 10 MG tablet Take 10 mg by mouth Daily.    Mila Wooten MD   furosemide (LASIX) 40 MG tablet Take 40 mg by mouth 3 (Three) Times a Day.    Mila Wooten MD   gabapentin (NEURONTIN) 300 MG capsule Take 300 mg by mouth 3 (Three) Times a Day.    Mila Wooten MD   naproxen (NAPROSYN) 500 MG tablet Take 500 mg by mouth 2 (Two) Times a Day With Meals.    Mila Wooten MD   predniSONE (DELTASONE) 50 MG tablet Take 1 tablet by mouth Daily. 1/1/23   Ravi Jarvis MD   QUEtiapine XR (SEROquel XR) 200 MG 24 hr  "tablet Take 200 mg by mouth Every Night.    Provider, Mila, MD        Social History:   Social History     Tobacco Use   • Smoking status: Every Day     Packs/day: 1.00     Types: Cigarettes   • Smokeless tobacco: Never   Vaping Use   • Vaping Use: Never used   Substance Use Topics   • Alcohol use: Not Currently     Comment: recovering alcholic   • Drug use: Not Currently         Review of Systems:  Review of Systems   Reason unable to perform ROS: Lethargic.   Respiratory: Positive for shortness of breath.         Physical Exam:  /74 (BP Location: Left arm, Patient Position: Lying)   Pulse 79   Temp 97.9 °F (36.6 °C) (Axillary)   Resp 17   Ht 172.7 cm (68\")   Wt 129 kg (284 lb 13.4 oz)   SpO2 99%   BMI 43.31 kg/m²     Physical Exam  Vitals and nursing note reviewed.   Constitutional:       General: He is not in acute distress.     Appearance: Normal appearance. He is not toxic-appearing.      Comments: Unable to arouse, mumbled speech   HENT:      Head: Normocephalic and atraumatic.      Mouth/Throat:      Mouth: Mucous membranes are moist.      Dentition: Abnormal dentition (poor).   Eyes:      General: No scleral icterus.  Cardiovascular:      Rate and Rhythm: Normal rate and regular rhythm.      Pulses: Normal pulses.           Radial pulses are 2+ on the right side and 2+ on the left side.      Heart sounds: Normal heart sounds. No murmur heard.    No friction rub.   Pulmonary:      Effort: Pulmonary effort is normal. No respiratory distress.      Breath sounds: Wheezing (expiratory) present. No decreased breath sounds, rhonchi or rales.      Comments: Snoring respiration  Abdominal:      General: Abdomen is flat. Bowel sounds are normal. There is no distension.      Palpations: Abdomen is soft.      Tenderness: There is no abdominal tenderness. There is no guarding or rebound.   Musculoskeletal:         General: Normal range of motion.      Cervical back: Normal range of motion and neck " supple.      Right lower leg: 3+ Pitting Edema present.      Left lower leg: 3+ Pitting Edema present.   Skin:     General: Skin is warm and dry.   Neurological:      Mental Status: He is oriented to person, place, and time. Mental status is at baseline. He is lethargic.      Comments: Responds to painful stimuli                  Procedures:  Procedures      Medical Decision Making:      Comorbidities that affect care:    COPD    External Notes reviewed:          The following orders were placed and all results were independently analyzed by me:  Orders Placed This Encounter   Procedures   • XR Chest 1 View   • CT Head Without Contrast   • CT Head Without Contrast   • [place] Draw   • Comprehensive Metabolic Panel   • BNP   • High Sensitivity Troponin T   • Urine Drug Screen - Urine, Clean Catch   • CBC Auto Differential   • High Sensitivity Troponin T 2Hr   • Blood Gas, Arterial -With Co-Ox Panel: Yes   • Ammonia   • aPTT   • Protime-INR   • Potassium   • Magnesium   • High Sensitivity Troponin T   • Blood Gas, Arterial -With Co-Ox Panel: Yes   • Blood Gas, Arterial -With Co-Ox Panel: Yes   • Basic Metabolic Panel   • ABG with Co-Ox and Electrolytes   • CBC Auto Differential   • CBC Auto Differential   • NPO Diet NPO Type: Strict NPO   • Undress & Gown   • Continuous Pulse Oximetry   • Vital Signs   • Reason for COPD Admission: Upper Respiratory Infection   • Tobacco Cessation Education   • Respiratory Treatment Education (MDI / Spacer / Nebulizer)   • COPD Education   • Vital Signs   • Intake & Output   • Weigh Patient   • Oral Care   • Telemetry - Maintain IV Access   • Continuous Cardiac Monitoring   • Telemetry - Pulse Oximetry   • Hospitalist (on-call MD unless specified)   • Oxygen Therapy- Nasal Cannula; 2 LPM; Titrate for SPO2: equal to or greater than, 92%   • Document Pulse Oximetry - On Room Air / Home O2 Level   • Oxygen Therapy- Nasal Cannula; Titrate for SPO2: 90% - 95%   • NIPPV (CPAP or BIPAP)   •  ECG 12 Lead ED Triage Standing Order; SOA   • ECG 12 Lead Chest Pain   • Insert Peripheral IV   • Insert Peripheral IV   • Inpatient Admission   • CBC & Differential   • Green Top (Gel)   • Lavender Top   • Gold Top - SST   • Light Blue Top   • CBC & Differential       Medications Given in the Emergency Department:  Medications   sodium chloride 0.9 % flush 10 mL (has no administration in time range)   sodium chloride 0.9 % flush 10 mL (has no administration in time range)   sodium chloride 0.9 % flush 10 mL (has no administration in time range)   sodium chloride 0.9 % infusion 40 mL (has no administration in time range)   heparin (porcine) 5000 UNIT/ML injection 5,000 Units (has no administration in time range)   nitroglycerin (NITROSTAT) SL tablet 0.4 mg (has no administration in time range)   sodium chloride 0.9 % infusion (has no administration in time range)   albuterol (PROVENTIL) nebulizer solution 0.083% 2.5 mg/3mL (has no administration in time range)   methylPREDNISolone sodium succinate (SOLU-Medrol) injection 62.5 mg (has no administration in time range)   acetaminophen (TYLENOL) tablet 650 mg (has no administration in time range)     Or   acetaminophen (TYLENOL) 160 MG/5ML solution 650 mg (has no administration in time range)     Or   acetaminophen (TYLENOL) suppository 650 mg (has no administration in time range)   naloxone (NARCAN) injection 0.4 mg (0.4 mg Intravenous Given 2/16/23 1543)   ipratropium-albuterol (DUO-NEB) nebulizer solution 3 mL (3 mL Nebulization Given 2/16/23 1558)   furosemide (LASIX) injection 60 mg (60 mg Intravenous Given 2/16/23 1602)        ED Course:    ED Course as of 02/16/23 2216   Thu Feb 16, 2023 1549 EKG: I reviewed and interpreted his twelve-lead EKG as normal sinus rhythm at 73 beats minute, normal DC's, normal QRS and ST and T waves; no acute ischemia or ectopy. [VS]      ED Course User Index  [VS] Thor David MD       Labs:    Lab Results (last 24 hours)      Procedure Component Value Units Date/Time    CBC & Differential [[id number]]  (Abnormal) Collected: 02/16/23 1301    Specimen: Blood Updated: 02/16/23 2924    Narrative:      The following orders were created for panel order CBC & Differential.  Procedure                               Abnormality         Status                     ---------                               -----------         ------                     CBC Auto Differential[[id number]]        Abnormal            Final result                 Please view results for these tests on the individual orders.    Comprehensive Metabolic Panel [[id number]]  (Abnormal) Collected: 02/16/23 1301    Specimen: Blood Updated: 02/16/23 1353     Glucose 108 mg/dL      BUN 14 mg/dL      Creatinine 1.16 mg/dL      Sodium 135 mmol/L      Potassium 4.4 mmol/L      Chloride 100 mmol/L      CO2 26.8 mmol/L      Calcium 9.1 mg/dL      Total Protein 8.8 g/dL      Albumin 4.1 g/dL      ALT (SGPT) 21 U/L      AST (SGOT) 21 U/L      Alkaline Phosphatase 117 U/L      Total Bilirubin 0.4 mg/dL      Globulin 4.7 gm/dL      A/G Ratio 0.9 g/dL      BUN/Creatinine Ratio 12.1     Anion Gap 8.2 mmol/L      eGFR 74.4 mL/min/1.73     Narrative:      GFR Normal >60  Chronic Kidney Disease <60  Kidney Failure <15      BNP [[id number]]  (Normal) Collected: 02/16/23 1301    Specimen: Blood Updated: 02/16/23 1349     proBNP <36.0 pg/mL     Narrative:      Among patients with dyspnea, NT-proBNP is highly sensitive for the detection of acute congestive heart failure. In addition NT-proBNP of <300 pg/ml effectively rules out acute congestive heart failure with 99% negative predictive value.      High Sensitivity Troponin T [[id number]]  (Normal) Collected: 02/16/23 1301    Specimen: Blood Updated: 02/16/23 1353     HS Troponin T 8 ng/L     Narrative:      High Sensitive Troponin T Reference Range:  <10.0 ng/L- Negative Female for AMI  <15.0 ng/L- Negative Male for AMI  >=10 - Abnormal Female indicating  possible myocardial injury.  >=15 - Abnormal Male indicating possible myocardial injury.   Clinicians would have to utilize clinical acumen, EKG, Troponin, and serial changes to determine if it is an Acute Myocardial Infarction or myocardial injury due to an underlying chronic condition.         CBC Auto Differential [[id number]]  (Abnormal) Collected: 02/16/23 1301    Specimen: Blood Updated: 02/16/23 1334     WBC 6.78 10*3/mm3      RBC 4.65 10*6/mm3      Hemoglobin 14.4 g/dL      Hematocrit 45.6 %      MCV 98.1 fL      MCH 31.0 pg      MCHC 31.6 g/dL      RDW 14.1 %      RDW-SD 51.2 fl      MPV 11.3 fL      Platelets 165 10*3/mm3      Neutrophil % 50.9 %      Lymphocyte % 26.4 %      Monocyte % 15.9 %      Eosinophil % 5.3 %      Basophil % 0.6 %      Immature Grans % 0.9 %      Neutrophils, Absolute 3.45 10*3/mm3      Lymphocytes, Absolute 1.79 10*3/mm3      Monocytes, Absolute 1.08 10*3/mm3      Eosinophils, Absolute 0.36 10*3/mm3      Basophils, Absolute 0.04 10*3/mm3      Immature Grans, Absolute 0.06 10*3/mm3      nRBC 0.0 /100 WBC     aPTT [[id number]]  (Abnormal) Collected: 02/16/23 1301    Specimen: Blood Updated: 02/16/23 1552     PTT 34.5 seconds     Protime-INR [[id number]]  (Normal) Collected: 02/16/23 1301    Specimen: Blood Updated: 02/16/23 1552     Protime 13.4 Seconds      INR 1.01    Narrative:      Suggested Therapeutic Ranges For Oral Anticoagulant Therapy:  Level of Therapy                      INR Target Range  Standard Dose                            2.0-3.0  High Dose                                2.5-3.5  Patients not receiving anticoagulant  Therapy Normal Range                     0.86-1.15    High Sensitivity Troponin T 2Hr [[id number]]  (Normal) Collected: 02/16/23 1542    Specimen: Blood Updated: 02/16/23 1610     HS Troponin T 7 ng/L      Troponin T Delta -1 ng/L     Narrative:      High Sensitive Troponin T Reference Range:  <10.0 ng/L- Negative Female for AMI  <15.0 ng/L- Negative  Male for AMI  >=10 - Abnormal Female indicating possible myocardial injury.  >=15 - Abnormal Male indicating possible myocardial injury.   Clinicians would have to utilize clinical acumen, EKG, Troponin, and serial changes to determine if it is an Acute Myocardial Infarction or myocardial injury due to an underlying chronic condition.         Ammonia [[id number]]  (Normal) Collected: 02/16/23 1542    Specimen: Blood Updated: 02/16/23 1611     Ammonia 54 umol/L     Blood Gas, Arterial -With Co-Ox Panel: Yes [[id number]]  (Abnormal) Collected: 02/16/23 1544    Specimen: Arterial Blood from Arm, Right Updated: 02/16/23 1554     pH, Arterial 7.189 pH units      pCO2, Arterial 62.9 mm Hg      pO2, Arterial 76.2 mm Hg      HCO3, Arterial 23.4 mmol/L      Base Excess, Arterial -6.0 mmol/L      O2 Saturation, Arterial 92.8 %      Hemoglobin, Blood Gas 15.5 g/dL      Carboxyhemoglobin 3.2 %      Methemoglobin 0.20 %      Oxyhemoglobin 89.6 %      FHHB 7.0 %      Site Arterial: right brachial     Modality Cannula     FIO2 28 %      Flow Rate 2 lpm      PO2/FIO2 272     Alex's Test N/A     Lactate, Arterial 0.90 mmol/L     ABG with Co-Ox and Electrolytes [[id number]]  (Abnormal) Collected: 02/16/23 1816    Specimen: Arterial Blood from Arm, Right Updated: 02/16/23 1821     pH, Arterial 7.246 pH units      pCO2, Arterial 62.5 mm Hg      pO2, Arterial 81.9 mm Hg      HCO3, Arterial 26.5 mmol/L      Base Excess, Arterial -2.3 mmol/L      O2 Saturation, Arterial 94.8 %      Hemoglobin, Blood Gas 15.8 g/dL      Carboxyhemoglobin 2.9 %      Methemoglobin 0.20 %      Oxyhemoglobin 91.9 %      FHHB 5.0 %      Alex's Test Positive     Note 14/7     Site Arterial: right radial     Modality BiPap     FIO2 32 %      Flow Rate --     Sodium, Arterial 137.6 mmol/L      Potassium, Arterial 4.91 mmol/L      Ionized Calcium, Arterial 1.16 mmol/L      Chloride, Arterial 101 mmol/L      Glucose, Arterial 118 mg/dL      Lactate, Arterial 1.02  mmol/L      PO2/FIO2 256    Urine Drug Screen - Urine, Clean Catch [[id number]]  (Normal) Collected: 02/16/23 1854    Specimen: Urine, Clean Catch Updated: 02/16/23 1928     Amphet/Methamphet, Screen Negative     Barbiturates Screen, Urine Negative     Benzodiazepine Screen, Urine Negative     Cocaine Screen, Urine Negative     Opiate Screen Negative     THC, Screen, Urine Negative     Methadone Screen, Urine Negative     Oxycodone Screen, Urine Negative    Narrative:      Negative Thresholds Per Drugs Screened:    Amphetamines                 500 ng/ml  Barbiturates                 200 ng/ml  Benzodiazepines              100 ng/ml  Cocaine                      300 ng/ml  Methadone                    300 ng/ml  Opiates                      300 ng/ml  Oxycodone                    100 ng/ml  THC                           50 ng/ml    The Normal Value for all drugs tested is negative. This report includes final unconfirmed screening results to be used for medical treatment purposes only. Unconfirmed results must not be used for non-medical purposes such as employment or legal testing. Clinical consideration should be applied to any drug of abuse test, particularly when unconfirmed results are used.                   Imaging:    CT Head Without Contrast    Result Date: 2/16/2023  PROCEDURE: CT HEAD WO CONTRAST  COMPARISON:  None INDICATIONS: Mental status change, unknown cause; Lethargic  PROTOCOL:   Standard imaging protocol performed    RADIATION:   DLP: 1018.2 mGy*cm   MA and/or KV was adjusted to minimize radiation dose.     TECHNIQUE: After obtaining the patient's consent, CT images were obtained without non-ionic intravenous contrast material.  FINDINGS:  Mild low density in the periventricular white matter likely represent small-vessel ischemic disease.  There is no specific CT evidence of acute infarction or intracranial hemorrhage.  The ventricles are normal in size and configuration.  There is a curvilinear  density in the medial left frontal region which is favored to represent a blood vessel  No focal calvarial abnormality is seen.  Visualized extracranial soft tissues appear normal.        1. Mild small vessel ischemic changes in the white matter 2. No specific CT evidence of acute infarction or intracranial hemorrhage.  If there is persistent clinical suspicion of acute neurologic abnormality, consider brain MRI to further evaluate.     Ronnie Cerda M.D.       Electronically Signed and Approved By: Ronnie Cerda M.D. on 2/16/2023 at 18:53             XR Chest 1 View    Result Date: 2/16/2023  PROCEDURE: XR CHEST 1 VW  COMPAR[facility]: [facility], [facility], XR CHEST 1 VW, 1/16/2023, 16:22.  INDICATIONS: APNEA, SHORTNESS OF BREATH, SMOKER X 40 YRS, NO Hx OF CANCER, PT STATES HE'S UNABLE TO STAY AWAKE  FINDINGS:  Heart size is stable.  Pulmonary vascularity is congested and indistinct with increased ground-glass and interstitial opacities in a perihilar and dependent predominance bilaterally.  Findings are more prominent than seen on the comparison study.  More focal opacity, blunting of the left costophrenic angle noted which may represent a small pleural effusion.  Osseous structures are unremarkable.        1. Pulmonary vascular congestion and prominence of the interstitial markings which may represent underlying pulmonary edema /infection in the correct clinical setting.       DEEPIKA ZIMMERMAN MD       Electronically Signed and Approved By: DEEPIKA ZIMMERMAN MD on 2/16/2023 at 15:29                 Differential Diagnosis and Discussion:    Previous Clinic Note: From one month ago in ED for alcohol cirrhosis of liver    All labs were reviewed and interpreted by me.  All X-rays were independently reviewed by me.  EKG was interpreted by me.  CT scan radiology interpretation was reviewed by me.    MDM           This patient is a 53-year-old male recently started on an increased dose of Seroquel 400 mg before bed now  presents very lethargic and difficult to arouse.    It looks like he probably has a component of oversedation from the Seroquel leading to some acute respiratory acidosis upon my review of his ABG.    We had to put him on BiPAP for his hypercapnic respiratory failure and I also gave him some IV Lasix to diurese him as he appears to be in CHF exacerbation based off his lower extremity pitting edema and chest x-ray showing volume overload.    He is more arousable but still requiring BiPAP and I will plan to admit him to a monitored bed at this point.          Critical Care Note: Total Critical Care time of 35 minutes. Total critical care time documented does not include time spent on separately billed procedures for services of nurses or physician assistants. I personally saw and examined the patient. I have reviewed all diagnostic interpretations and treatment plans as written. I was present for the key portions of any procedures performed and the inclusive time noted in any critical care statement. Critical care time includes patient management by me, time spent at the patients bedside,  time to review lab and imaging results, discussing patient care, documentation in the medical record, and time spent with family or caregiver.    Patient Care Considerations:          Consultants/Shared Management Plan:    Hospitalist: I have discussed the case with The accepting admitting hospitalist who agrees to accept the patient for admission.    Social Determinants of Health:    Patient is independent, reliable, and has access to care.       Disposition and Care Coordination:    Admit:   Through independent evaluation of the patient's history, physical, and imperical data, the patient meets criteria for observation/admission to the hospital.        Final diagnoses:   Altered mental status, unspecified altered mental status type   Acute on chronic congestive heart failure, unspecified heart failure type (HCC)   Acute  respiratory failure with hypercapnia (HCC)   Side effect of medication        ED Disposition     ED Disposition   Decision to Admit    Condition   --    Comment   Level of Care: Telemetry [5]   Diagnosis: Altered mental status, unspecified altered mental status type [[id number]]   Certification: I Certify That Inpatient Hospital Services Are Medically Necessary For Greater Than 2 Midnights               This medical record created using voice recognition software.        Documentation assistance provided by Ben Sawyer acting as scribe forInformation recorded by the scribe was done at my direction and has been verified and validated by me.          Ben Sawyer  02/16/23 1601       Thor David MD  02/16/23 8519

## 2023-02-16 NOTE — H&P
[facility]   HISTORY AND PHYSICAL    Patient Name: Jabari Dixon  : 1970  MRN: [id number]  Primary Care Physician:  Provider, No Known  Date of admission: 2023    Subjective   Subjective     Chief Complaint:   Altered mental status    History of Present Illness  Patient is a 53-year-old male with past medical history of cirrhosis of liver, COPD and hypertension.  Patient was brought in by family because he was basically unresponsive at home.  According to the family his medications have been adjusted recently and his Seroquel has been increased to 400 mg.  There is some question whether or not he had taken it late last night or early this morning.  The patient does have sleep apnea which he does not comply with CPAP.  Here in the emergency department he was found to be acidotic with a respiratory acidosis PCO2 greater than 60.  He was placed on BiPAP and has improved.  He is much easier to awaken.  He is also found to have an exacerbation of COPD but without pneumonia on chest x-ray.  He did have some pulmonary vascular congestion.  He has received IV diuretics and pulm toilet here in the emergency department.  He will be admitted for further evaluation and treatment.  Review of Systems     Personal History     Past Medical History:   Diagnosis Date   • Cirrhosis of liver (HCC)    • COPD (chronic obstructive pulmonary disease) (HCC)    • Hypertension        Past Surgical History:   Procedure Laterality Date   • PARACENTESIS      abd       Family History: family history is not on file. Otherwise pertinent FHx was reviewed and not pertinent to current issue.    Social History:  reports that he has been smoking cigarettes. He has been smoking an average of 1 pack per day. He has never used smokeless tobacco. He reports that he does not currently use alcohol. He reports that he does not currently use drugs.    Home Medications:  QUEtiapine, amLODIPine, buprenorphine-naloxone, doxepin, escitalopram,  furosemide, gabapentin, metoprolol succinate XL, naproxen, potassium chloride, and spironolactone    Allergies:  Allergies   Allergen Reactions   • Penicillins Hives       Objective    Objective     Vitals:   Temp:  [98 °F (36.7 °C)] 98 °F (36.7 °C)  Heart Rate:  [69-81] 79  Resp:  [17-20] 17  BP: (106-137)/() 133/95  Flow (L/min):  [2] 2    Physical Exam    Result Review    Result Review:  I have personally reviewed the results from the time of this admission to 2/16/2023 18:17 EST and agree with these findings:  [x]  Laboratory list / accordion  []  Microbiology  [x]  Radiology  []  EKG/Telemetry   []  Cardiology/Vascular   []  Pathology  []  Old records  []  Other:  Most notable findings include: Respiratory acidosis on ABG      Assessment & Plan   Assessment / Plan     Brief Patient Summary:  Jabari Dixon is a 53 y.o. male who presented to the emergency department basically obtunded.  He was found to be hypercapnic with a respiratory acidosis.  He was placed on BiPAP with moderate improvement in his clinical signs and symptoms however he will require hospital admission for further evaluation and treatment.    Active Hospital Problems:  1.  Acute on chronic hypercapnic hypoxic respiratory failure  2.  Respiratory acidosis  3.  Altered mental status/stupor secondary to above  4.  Sleep apnea  5.  Morbid obesity  6.  Hypertension  Plan:   Patient will be admitted to the hospital service.  Patient has been placed on BiPAP which we will continue.  The patient will be started on the COPD pathway.  We will treat him with nebulizer treatments and IV corticosteroids along with aggressive pulm toilet.  A repeat ABG and CT of the head have been ordered the first for assurance that his respiratory acidosis is improving the second is to rule out any possible underlying neurologic pathology.    DVT prophylaxis:  No DVT prophylaxis order currently exists.    CODE STATUS:     full let us call back for  Admission Status:  I  believe this patient meets 29-year-old inpatient status.    Crispin Castellon, DO

## 2023-02-17 ENCOUNTER — APPOINTMENT (OUTPATIENT)
Dept: CT IMAGING | Facility: HOSPITAL | Age: 53
DRG: 190 | End: 2023-02-17
Payer: MEDICARE

## 2023-02-17 ENCOUNTER — READMISSION MANAGEMENT (OUTPATIENT)
Dept: CALL CENTER | Facility: HOSPITAL | Age: 53
End: 2023-02-17
Payer: MEDICARE

## 2023-02-17 VITALS
BODY MASS INDEX: 43.17 KG/M2 | HEIGHT: 68 IN | SYSTOLIC BLOOD PRESSURE: 132 MMHG | DIASTOLIC BLOOD PRESSURE: 73 MMHG | OXYGEN SATURATION: 92 % | RESPIRATION RATE: 18 BRPM | WEIGHT: 284.83 LBS | HEART RATE: 81 BPM | TEMPERATURE: 98.9 F

## 2023-02-17 PROBLEM — R41.82 ALTERED MENTAL STATUS, UNSPECIFIED ALTERED MENTAL STATUS TYPE: Status: RESOLVED | Noted: 2023-02-16 | Resolved: 2023-02-17

## 2023-02-17 LAB
ANION GAP SERPL CALCULATED.3IONS-SCNC: 10.3 MMOL/L (ref 5–15)
ARTERIAL PATENCY WRIST A: POSITIVE
BASE EXCESS BLDA CALC-SCNC: -0.4 MMOL/L (ref -2–2)
BASOPHILS # BLD AUTO: 0.05 10*3/MM3 (ref 0–0.2)
BASOPHILS NFR BLD AUTO: 0.6 % (ref 0–1.5)
BDY SITE: ABNORMAL
BUN SERPL-MCNC: 17 MG/DL (ref 6–20)
BUN/CREAT SERPL: 15.5 (ref 7–25)
CALCIUM SPEC-SCNC: 9 MG/DL (ref 8.6–10.5)
CHLORIDE SERPL-SCNC: 101 MMOL/L (ref 98–107)
CO2 SERPL-SCNC: 25.7 MMOL/L (ref 22–29)
COHGB MFR BLD: 1.6 % (ref 0–1.5)
CREAT SERPL-MCNC: 1.1 MG/DL (ref 0.76–1.27)
DEPRECATED RDW RBC AUTO: 50.4 FL (ref 37–54)
EGFRCR SERPLBLD CKD-EPI 2021: 79.3 ML/MIN/1.73
EOSINOPHIL # BLD AUTO: 0.35 10*3/MM3 (ref 0–0.4)
EOSINOPHIL NFR BLD AUTO: 4.5 % (ref 0.3–6.2)
ERYTHROCYTE [DISTWIDTH] IN BLOOD BY AUTOMATED COUNT: 14 % (ref 12.3–15.4)
FHHB: 4.4 % (ref 0–5)
GLUCOSE SERPL-MCNC: 118 MG/DL (ref 65–99)
HCO3 BLDA-SCNC: 27.4 MMOL/L (ref 22–26)
HCT VFR BLD AUTO: 43.8 % (ref 37.5–51)
HGB BLD-MCNC: 14.2 G/DL (ref 13–17.7)
HGB BLDA-MCNC: 14.8 G/DL (ref 13.8–16.4)
IMM GRANULOCYTES # BLD AUTO: 0.06 10*3/MM3 (ref 0–0.05)
IMM GRANULOCYTES NFR BLD AUTO: 0.8 % (ref 0–0.5)
INHALED O2 CONCENTRATION: 28 %
LACTATE BLDA-SCNC: ABNORMAL MMOL/L
LYMPHOCYTES # BLD AUTO: 1.52 10*3/MM3 (ref 0.7–3.1)
LYMPHOCYTES NFR BLD AUTO: 19.4 % (ref 19.6–45.3)
M PNEUMO IGM SER QL: NEGATIVE
MCH RBC QN AUTO: 31.4 PG (ref 26.6–33)
MCHC RBC AUTO-ENTMCNC: 32.4 G/DL (ref 31.5–35.7)
MCV RBC AUTO: 96.9 FL (ref 79–97)
METHGB BLD QL: 0.2 % (ref 0–1.5)
MODALITY: ABNORMAL
MONOCYTES # BLD AUTO: 1.06 10*3/MM3 (ref 0.1–0.9)
MONOCYTES NFR BLD AUTO: 13.5 % (ref 5–12)
NEUTROPHILS NFR BLD AUTO: 4.81 10*3/MM3 (ref 1.7–7)
NEUTROPHILS NFR BLD AUTO: 61.2 % (ref 42.7–76)
NOTE: ABNORMAL
NRBC BLD AUTO-RTO: 0 /100 WBC (ref 0–0.2)
OXYHGB MFR BLDV: 93.8 % (ref 94–99)
PCO2 BLDA: 57.6 MM HG (ref 35–45)
PH BLDA: 7.29 PH UNITS (ref 7.35–7.45)
PLATELET # BLD AUTO: 165 10*3/MM3 (ref 140–450)
PMV BLD AUTO: 11.6 FL (ref 6–12)
PO2 BLD: 304 MM[HG] (ref 0–500)
PO2 BLDA: 85.1 MM HG (ref 80–100)
POTASSIUM SERPL-SCNC: 4.3 MMOL/L (ref 3.5–5.2)
RBC # BLD AUTO: 4.52 10*6/MM3 (ref 4.14–5.8)
SAO2 % BLDCOA: 95.5 % (ref 95–99)
SARS-COV-2 RNA RESP QL NAA+PROBE: NOT DETECTED
SODIUM SERPL-SCNC: 137 MMOL/L (ref 136–145)
WBC NRBC COR # BLD: 7.85 10*3/MM3 (ref 3.4–10.8)

## 2023-02-17 PROCEDURE — 94799 UNLISTED PULMONARY SVC/PX: CPT

## 2023-02-17 PROCEDURE — 71250 CT THORAX DX C-: CPT

## 2023-02-17 PROCEDURE — 80048 BASIC METABOLIC PNL TOTAL CA: CPT | Performed by: FAMILY MEDICINE

## 2023-02-17 PROCEDURE — 25010000002 HEPARIN (PORCINE) PER 1000 UNITS: Performed by: FAMILY MEDICINE

## 2023-02-17 PROCEDURE — U0005 INFEC AGEN DETEC AMPLI PROBE: HCPCS | Performed by: FAMILY MEDICINE

## 2023-02-17 PROCEDURE — 82375 ASSAY CARBOXYHB QUANT: CPT | Performed by: FAMILY MEDICINE

## 2023-02-17 PROCEDURE — 99239 HOSP IP/OBS DSCHRG MGMT >30: CPT | Performed by: FAMILY MEDICINE

## 2023-02-17 PROCEDURE — 70450 CT HEAD/BRAIN W/O DYE: CPT

## 2023-02-17 PROCEDURE — 82805 BLOOD GASES W/O2 SATURATION: CPT | Performed by: FAMILY MEDICINE

## 2023-02-17 PROCEDURE — 25010000002 METHYLPREDNISOLONE PER 40 MG: Performed by: FAMILY MEDICINE

## 2023-02-17 PROCEDURE — 94660 CPAP INITIATION&MGMT: CPT

## 2023-02-17 PROCEDURE — U0004 COV-19 TEST NON-CDC HGH THRU: HCPCS | Performed by: FAMILY MEDICINE

## 2023-02-17 PROCEDURE — 36600 WITHDRAWAL OF ARTERIAL BLOOD: CPT | Performed by: FAMILY MEDICINE

## 2023-02-17 PROCEDURE — 97161 PT EVAL LOW COMPLEX 20 MIN: CPT

## 2023-02-17 PROCEDURE — 83050 HGB METHEMOGLOBIN QUAN: CPT | Performed by: FAMILY MEDICINE

## 2023-02-17 PROCEDURE — 99223 1ST HOSP IP/OBS HIGH 75: CPT | Performed by: INTERNAL MEDICINE

## 2023-02-17 PROCEDURE — 85025 COMPLETE CBC W/AUTO DIFF WBC: CPT | Performed by: FAMILY MEDICINE

## 2023-02-17 PROCEDURE — 94664 DEMO&/EVAL PT USE INHALER: CPT

## 2023-02-17 RX ORDER — ARFORMOTEROL TARTRATE 15 UG/2ML
15 SOLUTION RESPIRATORY (INHALATION)
Status: DISCONTINUED | OUTPATIENT
Start: 2023-02-17 | End: 2023-02-17 | Stop reason: HOSPADM

## 2023-02-17 RX ORDER — BUDESONIDE 0.5 MG/2ML
0.5 INHALANT ORAL
Status: DISCONTINUED | OUTPATIENT
Start: 2023-02-17 | End: 2023-02-17 | Stop reason: HOSPADM

## 2023-02-17 RX ORDER — METHYLPREDNISOLONE SODIUM SUCCINATE 40 MG/ML
40 INJECTION, POWDER, LYOPHILIZED, FOR SOLUTION INTRAMUSCULAR; INTRAVENOUS EVERY 8 HOURS
Status: DISCONTINUED | OUTPATIENT
Start: 2023-02-17 | End: 2023-02-17 | Stop reason: HOSPADM

## 2023-02-17 RX ORDER — IPRATROPIUM BROMIDE AND ALBUTEROL SULFATE 2.5; .5 MG/3ML; MG/3ML
3 SOLUTION RESPIRATORY (INHALATION)
Status: DISCONTINUED | OUTPATIENT
Start: 2023-02-17 | End: 2023-02-17 | Stop reason: HOSPADM

## 2023-02-17 RX ADMIN — HEPARIN SODIUM 5000 UNITS: 5000 INJECTION INTRAVENOUS; SUBCUTANEOUS at 14:28

## 2023-02-17 RX ADMIN — ALBUTEROL SULFATE 2.5 MG: 2.5 SOLUTION RESPIRATORY (INHALATION) at 00:45

## 2023-02-17 RX ADMIN — Medication 10 ML: at 08:10

## 2023-02-17 RX ADMIN — BUDESONIDE 0.5 MG: 0.5 INHALANT ORAL at 11:33

## 2023-02-17 RX ADMIN — ALBUTEROL SULFATE 2.5 MG: 2.5 SOLUTION RESPIRATORY (INHALATION) at 07:10

## 2023-02-17 RX ADMIN — METHYLPREDNISOLONE SODIUM SUCCINATE 40 MG: 40 INJECTION, POWDER, FOR SOLUTION INTRAMUSCULAR; INTRAVENOUS at 08:10

## 2023-02-17 RX ADMIN — ARFORMOTEROL TARTRATE 15 MCG: 15 SOLUTION RESPIRATORY (INHALATION) at 11:33

## 2023-02-17 RX ADMIN — HEPARIN SODIUM 5000 UNITS: 5000 INJECTION INTRAVENOUS; SUBCUTANEOUS at 06:13

## 2023-02-17 NOTE — DISCHARGE SUMMARY
[facility]         HOSPITALIST  DISCHARGE SUMMARY    Patient Name: Jabari Dixon  : 1970  MRN: [id number]    Date of Admission: 2023  Date of Discharge:  2023 Left Against Medical Advice  Primary Care Physician: Ernesto Cerda MD    Consults     Date and Time Order Name Status Description    2023  7:39 AM Inpatient Pulmonology Consult Completed     2023  5:05 PM Hospitalist (on-call MD unless specified)            Active and Resolved Hospital Problems:  Acute on chronic hypercapnic hypoxic respiratory failure  Respiratory acidosis  Altered mental status/stupor secondary to above  Sleep apnea  Morbid obesity  Hypertension  Active Hospital Problems    Diagnosis POA   • Acute on chronic respiratory failure with hypercapnia (HCC) [J96.22] Yes      Resolved Hospital Problems    Diagnosis POA   • **Altered mental status, unspecified altered mental status type [R41.82] Yes       Hospital Course     Hospital Course:  53-year-old male hospitalized on 2023 with altered mental status, liver cirrhosis, COPD, uncontrolled blood pressure, his mentation improved, pulmonary was consulted, the patient left AGAINST MEDICAL ADVICE despite efforts to educate him on the importance of staying for medical care.  I consulted pulmonary to evaluate him for hypercarbia, he was on BiPAP he tolerated this overnight.  CT scan was reviewed which did not show any pleural fluids or collections.  This patient is high risk for readmission as he has not following medical care and guidelines will likely end up rehospitalized in the near future.  Ultimately decision for leaving AGAINST MEDICAL ADVICE appears to be related to the want for smoking.  Seen and examined prior to his decision of leaving AGAINST MEDICAL ADVICE, he is no acute distress, he is breathing on nasal cannula oxygen at 2 L, he still is volume overloaded.  Denies chest pain or palpitations.  He denied fevers, chills, sweats.   Telemetry reviewed, no acute major rhythm events.        Day of Discharge     Vital Signs:  Temp:  [97.8 °F (36.6 °C)-99.2 °F (37.3 °C)] 98.9 °F (37.2 °C)  Heart Rate:  [69-81] 81  Resp:  [17-26] 18  BP: (120-137)/(72-95) 132/73  Flow (L/min):  [2] 2  Review of systems:  All systems reviewed and negative except for generalized fatigue, generalized weakness    Physical exam:   Constitutional: Awake, alert, no acute distress   Eyes: Pupils equal, sclerae anicteric, no conjunctival injection   HENT: NCAT, mucous membranes moist   Neck: Supple, no thyromegaly, no lymphadenopathy, trachea midline   Respiratory: Coarse breath sounds throughout, scattered, breathing nasal cannula oxygen cardiovascular: RRR, no murmurs, rubs, or gallops, palpable pedal pulses bilaterally   Gastrointestinal: Positive bowel sounds, soft, nontender, nondistended   Musculoskeletal: 2+ bilateral ankle edema, no clubbing or cyanosis to extremities   Psychiatric: Appropriate affect, cooperative   Neurologic: Oriented x 3, strength symmetric in all extremities, Cranial Nerves grossly intact to confrontation, speech clear   Skin: No rashes visible on exposed skin        Discharge Details        Discharge Medications      ASK your doctor about these medications      Instructions Start Date   amLODIPine 5 MG tablet  Commonly known as: NORVASC   5 mg, Oral, Daily      buprenorphine-naloxone 8-2 MG per SL tablet  Commonly known as: SUBOXONE   1 tablet, Sublingual, Daily      doxepin 50 MG capsule  Commonly known as: SINEquan   50 mg, Oral, Every Night at Bedtime      escitalopram 20 MG tablet  Commonly known as: LEXAPRO  Ask about: Which instructions should I use?   20 mg, Oral, Every Morning      furosemide 40 MG tablet  Commonly known as: LASIX   40 mg, Oral, 3 Times Daily      gabapentin 600 MG tablet  Commonly known as: NEURONTIN  Ask about: Which instructions should I use?   1 tablet, Oral, 3 Times Daily      metoprolol succinate XL 50 MG 24 hr  tablet  Commonly known as: TOPROL-XL   1 tablet, Oral, Daily      naproxen 500 MG tablet  Commonly known as: NAPROSYN   500 mg, Oral, 2 Times Daily PRN      potassium chloride 20 MEQ CR tablet  Commonly known as: K-DUR,KLOR-CON   1 tablet, Oral, 2 Times Daily      QUEtiapine 400 MG tablet  Commonly known as: SEROquel  Ask about: Which instructions should I use?   400 mg, Oral, Every Night at Bedtime      spironolactone 50 MG tablet  Commonly known as: ALDACTONE   1 tablet, Oral, 2 Times Daily             Allergies   Allergen Reactions   • Penicillins Hives       Discharge Disposition:  Left Against Medical Advice    Diet:  Hospital:  Diet Order   Procedures   • Diet: Regular/House Diet; Texture: Regular Texture (IDDSI 7); Fluid Consistency: Thin (IDDSI 0)       Discharge Activity: return to ER      CODE STATUS:  Code Status and Medical Interventions:   Ordered at: 02/17/23 0739     Level Of Support Discussed With:    Patient     Code Status (Patient has no pulse and is not breathing):    CPR (Attempt to Resuscitate)     Medical Interventions (Patient has pulse or is breathing):    Full Support     Release to patient:    Routine Release         Future Appointments   Date Time Provider Department Center   5/3/2023 11:00 AM Maverick Ryan MD [facility] SLEEP CT [facility]           Pertinent  and/or Most Recent Results     PROCEDURES:   CT Head Without Contrast    Result Date: 2/17/2023  PROCEDURE: CT HEAD WO CONTRAST  COMPARISON:  [facility], CT, CT HEAD WO CONTRAST, 2/16/2023, 18:37. INDICATIONS: Mental status change, unknown cause  PROTOCOL:   Standard imaging protocol performed    RADIATION:   DLP: 841mGy*cm   MA and/or KV was adjusted to minimize radiation dose.     TECHNIQUE: After obtaining the patient's consent, CT images were obtained without non-ionic intravenous contrast material.  FINDINGS:  No focal brain lesion, mass or intracranial hemorrhage is identified.  There is mild low density in the  periventricular white matter consistent with small vessel ischemic disease.  The ventricles are normal in size and configuration.  No calvarial abnormality is seen.  Visualized extracranial soft tissues appear normal.        1. Mild small vessel ischemic changes in the white matter 2. No CT evidence of acute infarction, mass or intracranial hemorrhage     Ronnie Cerda M.D.       Electronically Signed and Approved By: Ronnie Cerda M.D. on 2/17/2023 at 15:21             CT Head Without Contrast    Result Date: 2/16/2023  PROCEDURE: CT HEAD WO CONTRAST  COMPARISON:  None INDICATIONS: Mental status change, unknown cause; Lethargic  PROTOCOL:   Standard imaging protocol performed    RADIATION:   DLP: 1018.2 mGy*cm   MA and/or KV was adjusted to minimize radiation dose.     TECHNIQUE: After obtaining the patient's consent, CT images were obtained without non-ionic intravenous contrast material.  FINDINGS:  Mild low density in the periventricular white matter likely represent small-vessel ischemic disease.  There is no specific CT evidence of acute infarction or intracranial hemorrhage.  The ventricles are normal in size and configuration.  There is a curvilinear density in the medial left frontal region which is favored to represent a blood vessel  No focal calvarial abnormality is seen.  Visualized extracranial soft tissues appear normal.        1. Mild small vessel ischemic changes in the white matter 2. No specific CT evidence of acute infarction or intracranial hemorrhage.  If there is persistent clinical suspicion of acute neurologic abnormality, consider brain MRI to further evaluate.     Ronnie Cerda M.D.       Electronically Signed and Approved By: Ronnie Cerda M.D. on 2/16/2023 at 18:53             CT Chest Without Contrast Diagnostic    Result Date: 2/17/2023  PROCEDURE: CT CHEST WO CONTRAST DIAGNOSTIC  COMPARISON: [facility], CT, CT ABDOMEN PELVIS WO CONTRAST, 1/04/2023, 20:30.   INDICATIONS: Respiratory illness, nondiagnostic xray  TECHNIQUE: CT images were created without the administration of contrast material.   PROTOCOL:   Standard imaging protocol performed    RADIATION:   DLP: 436mGy*cm   Automated exposure control was utilized to minimize radiation dose.  FINDINGS:  LUNGS: There is scar in the lingular segment of the left upper lobe.  There is mild basilar atelectasis. VASCULATURE: Normal.  Thrombus cannot be excluded without intravenous contrast.  ASMITA: Normal.  No mass or adenopathy.  MEDIASTINUM: Normal.  No mass or adenopathy.  CARDIAC: There is mild coronary artery calcific atherosclerosis. PLEURA: Normal.  No mass or effusion.  AORTA: Normal.  No aneurysm.  CHEST WALL: Normal.  No mass or axillary adenopathy.  LIMITED ABDOMEN: There is a nodular appearance of the liver consistent with cirrhosis.  There is splenomegaly and evidence of portal hypertension.  There are multiple prominent lymph nodes in the mychal hepatis likely from hepatitis. BONES: Normal.  No bony lesion or fracture.        Cirrhosis, splenomegaly with evidence of portal hypertension.  Prominent lymph nodes in the mychal hepatis may be secondary to viral hepatitis.   MARIE KLEIN MD       Electronically Signed and Approved By: MARIE KLEIN MD on 2/17/2023 at 16:20             XR Chest 1 View    Result Date: 2/16/2023  PROCEDURE: XR CHEST 1 VW  COMPARISON: [facility], , XR CHEST 1 VW, 1/16/2023, 16:22.  INDICATIONS: APNEA, SHORTNESS OF BREATH, SMOKER X 40 YRS, NO Hx OF CANCER, PT STATES HE'S UNABLE TO STAY AWAKE  FINDINGS:  Heart size is stable.  Pulmonary vascularity is congested and indistinct with increased ground-glass and interstitial opacities in a perihilar and dependent predominance bilaterally.  Findings are more prominent than seen on the comparison study.  More focal opacity, blunting of the left costophrenic angle noted which may represent a small pleural effusion.  Osseous structures  are unremarkable.        1. Pulmonary vascular congestion and prominence of the interstitial markings which may represent underlying pulmonary edema /infection in the correct clinical setting.       DEEPIKA ZIMMERMAN MD       Electronically Signed and Approved By: DEEPIKA ZIMMERMAN MD on 2/16/2023 at 15:29               LAB RESULTS:      Lab 02/17/23  0404 02/16/23 2225 02/16/23 1816 02/16/23  1544 02/16/23  1301   WBC 7.85 8.15  --   --  6.78   HEMOGLOBIN 14.2 14.2  --   --  14.4   HEMATOCRIT 43.8 43.9  --   --  45.6   PLATELETS 165 163  --   --  165   NEUTROS ABS 4.81 4.89  --   --  3.45   IMMATURE GRANS (ABS) 0.06* 0.07*  --   --  0.06*   LYMPHS ABS 1.52 1.61  --   --  1.79   MONOS ABS 1.06* 1.14*  --   --  1.08*   EOS ABS 0.35 0.38  --   --  0.36   MCV 96.9 96.1  --   --  98.1*   LACTATE, ARTERIAL  --   --  1.02 0.90  --    PROTIME  --   --   --   --  13.4   APTT  --   --   --   --  34.5*         Lab 02/17/23 0404 02/16/23 2225 02/16/23  1816 02/16/23  1301   SODIUM 137 136  --  135*   SODIUM, ARTERIAL  --   --  137.6  --    POTASSIUM 4.3 4.5  --  4.4   CHLORIDE 101 100  --  100   CO2 25.7 26.0  --  26.8   ANION GAP 10.3 10.0  --  8.2   BUN 17 16  --  14   CREATININE 1.10 1.17  --  1.16   EGFR 79.3 73.6  --  74.4   GLUCOSE 118* 127*  --  108*   GLUCOSE, ARTERIAL  --   --  118*  --    CALCIUM 9.0 9.1  --  9.1   IONIZED CALCIUM  --   --  1.16  --          Lab 02/16/23  1301   TOTAL PROTEIN 8.8*   ALBUMIN 4.1   GLOBULIN 4.7   ALT (SGPT) 21   AST (SGOT) 21   BILIRUBIN 0.4   ALK PHOS 117         Lab 02/16/23  1542 02/16/23  1301   PROBNP  --  <36.0   HSTROP T 7 8   PROTIME  --  13.4   INR  --  1.01                 Lab 02/17/23  0315 02/16/23  2215 02/16/23  1816   PH, ARTERIAL 7.295* 7.273* 7.246*   PCO2, ARTERIAL 57.6* 62.2* 62.5*   PO2 ART 85.1 94.8 81.9   O2 SATURATION ART 95.5 96.3 94.8*   FIO2 28 36 32   HCO3 ART 27.4* 28.1* 26.5*   BASE EXCESS ART -0.4 -0.3 -2.3*   CARBOXYHEMOGLOBIN 1.6* 1.7* 2.9*     Brief  Urine Lab Results  (Last result in the past 365 days)      Color   Clarity   Blood   Leuk Est   Nitrite   Protein   CREAT   Urine HCG        01/16/23 1239 Yellow   Clear   Negative   Trace   Negative   Negative               Microbiology Results (last 10 days)     Procedure Component Value - Date/Time    COVID PRE-OP / PRE-PROCEDURE SCREENING ORDER (NO ISOLATION) - Swab, Nasopharynx [[id number]]  (Normal) Collected: 02/17/23 0807    Lab Status: Final result Specimen: Swab from Nasopharynx Updated: 02/17/23 1445    Narrative:      The following orders were created for panel order COVID PRE-OP / PRE-PROCEDURE SCREENING ORDER (NO ISOLATION) - Swab, Nasopharynx.  Procedure                               Abnormality         Status                     ---------                               -----------         ------                     COVID-19,APTIMA PANTHER(...[[id number]]  Normal              Final result                 Please view results for these tests on the individual orders.    COVID-19,APTIMA PANTHER(PR[facility]),[facility], NP/OP SWAB IN UTM/VTM/SALINE TRANSPORT MEDIA,24 HR TAT - Swab, Nasopharynx [[id number]]  (Normal) Collected: 02/17/23 0807    Lab Status: Final result Specimen: Swab from Nasopharynx Updated: 02/17/23 1445     COVID19 Not Detected    Narrative:      Fact sheet for providers: https://www.fda.gov/media/624259/download     Fact sheet for patients: https://www.fda.gov/media/675496/download    Test performed by RT PCR.    Mycoplasma Pneumoniae Antibody, IgM - Blood, [[id number]]  (Normal) Collected: 02/16/23 1301    Lab Status: Final result Specimen: Blood Updated: 02/17/23 0834     Mycoplasma pneumo IgM Negative          CT Head Without Contrast    Result Date: 2/17/2023  Impression:   1. Mild small vessel ischemic changes in the white matter 2. No CT evidence of acute infarction, mass or intracranial hemorrhage     Ronnie Cerda M.D.       Electronically Signed and Approved By: Ronnie Cerda M.D. on  2/17/2023 at 15:21             CT Head Without Contrast    Result Date: 2/16/2023  Impression:   1. Mild small vessel ischemic changes in the white matter 2. No specific CT evidence of acute infarction or intracranial hemorrhage.  If there is persistent clinical suspicion of acute neurologic abnormality, consider brain MRI to further evaluate.     Ronnie Cerda M.D.       Electronically Signed and Approved By: Ronnei Cerda M.D. on 2/16/2023 at 18:53             CT Chest Without Contrast Diagnostic    Result Date: 2/17/2023  Impression:  Cirrhosis, splenomegaly with evidence of portal hypertension.  Prominent lymph nodes in the mychal hepatis may be secondary to viral hepatitis.   MARIE KLEIN MD       Electronically Signed and Approved By: MARIE KLEIN MD on 2/17/2023 at 16:20             XR Chest 1 View    Result Date: 2/16/2023  Impression:   1. Pulmonary vascular congestion and prominence of the interstitial markings which may represent underlying pulmonary edema /infection in the correct clinical setting.       DEEPIKA ZIMMERMAN MD       Electronically Signed and Approved By: DEEPIKA ZIMMERMAN MD on 2/16/2023 at 15:29                           Labs Pending at Discharge:  See orders        Time spent on Discharge including face to face service:  35 minutes    Electronically signed by Kirt Ulloa MD, 02/17/23, 4:48 PM EST.    Portions of this documentation were transcribed electronically from a voice recognition software.  I confirm all data accurately represents the service(s) I performed at today's visit.

## 2023-02-17 NOTE — CASE MANAGEMENT/SOCIAL WORK
"Discharge Planning Assessment   Faustino     Patient Name: Jabari Dixon  MRN: [id number]  Today's Date: 2/17/2023    Admit Date: 2/16/2023    Plan: Met with pt at bedside to discuss dc planning. Pt states he is currently at [facility] for rehab for alcohol abuse for 2 months. Pt states he was at [facility] meeting when someoen noticed he was so lethargic and unable to keep his eyes open. Pt states he was told he needed a CPAP but does not have one and unsure if he will wear it due to feeling like hes suffocating in it. Educated pt that there are many different mask options now. MD aware and has pulm on the case to follow for CPAP. Spoke with ksenia who is at [facility] about pt returning. There is also Derrick at [facility] who pt states is \"the boss\". Derrick contact information is [phone]. [facility] will provide transporation at time fo discharge. Pt states that he drank 30 pack a day plus whiskey daily but very eager to stay clean. CM/SW will follow for possible resp needs .   Discharge Needs Assessment     Row Name 02/17/23 0907       Living Environment    People in Home other (see comments)    Unique Family Situation Lives at [facility]    Current Living Arrangements other (see comments);group home    Potentially Unsafe Housing Conditions none    Primary Care Provided by self    Provides Primary Care For no one    Able to Return to Prior Arrangements yes       Resource/Environmental Concerns    Resource/Environmental Concerns none    Transportation Concerns none       Food Insecurity    Within the past 12 months, you worried that your food would run out before you got the money to buy more. Never true    Within the past 12 months, the food you bought just didn't last and you didn't have money to get more. Never true       Transition Planning    Patient/Family Anticipates Transition to other (see comments)  Back to [facility]    Patient/Family Anticipated Services at Transition respiratory services    " "Transportation Anticipated family or friend will provide       Discharge Needs Assessment    Readmission Within the Last 30 Days no previous admission in last 30 days    Equipment Currently Used at Home cane, straight    Concerns to be Addressed discharge planning    Anticipated Changes Related to Illness none    Equipment Needed After Discharge oxygen;pulse ox;cpap    Provided Post Acute Provider List? N/A    Provided Post Acute Provider Quality & Resource List? N/A    Discharge Coordination/Progress Non compliance               Discharge Plan     Row Name 02/17/23 0916       Plan    Plan Met with pt at bedside to discuss dc planning. Pt states he is currently at [facility] for rehab for alcohol abuse for 2 months. Pt states he was at [facility] meeting when someoen noticed he was so lethargic and unable to keep his eyes open. Pt states he was told he needed a CPAP but does not have one and unsure if he will wear it due to feeling like hes suffocating in it. Educated pt that there are many different mask options now. MD aware and has pulm on the case to follow for CPAP. Spoke with ksenia who is at [facility] about pt returning. There is also Derrick at [facility] who pt states is \"the boss\". Derrick contact information is [phone]. [facility] will provide transporation at time fo discharge. Pt states that he drank 30 pack a day plus whiskey daily but very eager to stay clean. CM/SW will follow for possible resp needs .    Patient/Family in Agreement with Plan yes              Continued Care and Services - Admitted Since 2/16/2023    Coordination has not been started for this encounter.          Demographic Summary     Row Name 02/17/23 0904       General Information    Admission Type inpatient    Arrived From emergency department    Referral Source emergency department    Reason for Consult discharge planning    Preferred Language English       Contact Information    Permission Granted to Share Info With case " manager;family/designee               Functional Status     Row Name 02/17/23 0906       Functional Status    Usual Activity Tolerance good    Current Activity Tolerance good       Physical Activity    On average, how many days per week do you engage in moderate to strenuous exercise (like a brisk walk)? 0 days    On average, how many minutes do you engage in exercise at this level? 0 min  Pt admits he does not exercise    Number of minutes of exercise per week 0       Assessment of Health Literacy    How often do you have someone help you read hospital materials? Sometimes    How often do you have problems learning about your medical condition because of difficulty understanding written information? Sometimes    How often do you have a problem understanding what is told to you about your medical condition? Sometimes    How confident are you filling out medical forms by yourself? Somewhat    Health Literacy Moderate       Functional Status, IADL    Medications independent    Meal Preparation independent    Housekeeping independent    Laundry independent    Shopping independent       Mental Status    General Appearance WDL WDL       Mental Status Summary    Recent Changes in Mental Status/Cognitive Functioning no changes               Psychosocial    No documentation.                Abuse/Neglect    No documentation.                Legal    No documentation.                Substance Abuse    No documentation.                Patient Forms    No documentation.                   Kelly Muniz RN

## 2023-02-17 NOTE — PLAN OF CARE
Goal Outcome Evaluation:  Plan of Care Reviewed With: patient        Progress: no change  Outcome Evaluation: Patient not appropriate for skilled PT services at this time as he has not had a decline in functional mobility. Patient safe to continue ambulating in his room until discharge from the hospital. D/C PT order at this time with patient in agreement.

## 2023-02-17 NOTE — PLAN OF CARE
Goal Outcome Evaluation:  Plan of Care Reviewed With: patient        Progress: improving  Outcome Evaluation: Patient tolerating continuous bipap at this time.

## 2023-02-17 NOTE — PLAN OF CARE
Goal Outcome Evaluation:  Plan of Care Reviewed With: patient        Progress: improving  Outcome Evaluation: Patient is awake and more alert this morning, pulling at bipap mask and requesting to take off bipap. Patient was placed on 2L nasal cannula. RT will monitor patient and assess for need to place bipap back on.

## 2023-02-17 NOTE — PROGRESS NOTES
Respiratory Therapist Broncho-Pulmonary Hygiene Progress Note      Patient Name:  Jabari Dixon  YOB: 1970    Jabari Dixon meets the qualification for Level 1 of the Bronco-Pulmonary Hygiene Protocol. This was based on my daily patient assessment and includes review of chest x-ray results, cough ability and quality, oxygenation, secretions or risk for secretion development and patient mobility.     Broncho-Pulmonary Hygiene Assessment:    Level of Movement: Actively changing positions without assistance  Alert/ oriented/ cooperative    Breath Sounds: Diminished and/or coarse rhonchi    Cough: Strong, effective    Chest X-Ray: Possible signs of consolidation and/or atelectasis or clear.     Sputum Productions: None or small amount of thin or watery secretions with effective cough    History and Physical: Chronic condition    SpO2 to Oxygen Need: greater than 92% on room air or  less than 3L nasal canula    Current SpO2 is: 96% on room air    Based on this information, I have completed the following interventions: Teach/Instruct patient on cough and deep breathe      Electronically signed by Aixa Carreon RRT, 02/17/23, 11:36 AM EST.

## 2023-02-17 NOTE — CONSULTS
[facility]   Consult Note    Patient Name: Jabari Dixon  : 1970  MRN: [id number]  Primary Care Physician:  Ernesto Creda MD  Referring Physician: No ref. provider found  Date of admission: 2023    Consults  Subjective   Subjective     Reason for Consult/ Chief Complaint: Reason for consult hypercarbia    History of Present Illness  Jabari Dixon is a 53 y.o. male mid to the hospital with altered mental status  Patient has past medical history significant for cirrhosis, COPD, hypertension and drug abuse  Currently living in a care home house for his drug dependency  Patient states that he took some Suboxone yesterday and had been taking Neurontin  Shortly afterwards the patient states that he got very sleepy  Also his Seroquel had been increased to 400  Does have a history of sleep apnea not compliant with CPAP  Brought to the emergency room for altered mental status  Arterial blood gas showed the presence of hypercapnia with mild acidosis  Patient received diuretics sedating medications have been held  He is currently back to his baseline neurological status    Review of Systems   Gen.-Negative for fevers, chills, night sweats, weight loss, weight gain  Eyes-negative for any change in vision, double vision, blurry vision, painful red eyes  Head ears nose throat-negative for any headaches, negative for any sore throat, difficulty swallowing, painful swallowing, negative for any nosebleeds, a nasal ulcerations, or nasal congestion, postnasal drip  Respiratory-negative for shortness of breath, cough, change in sputum, hemoptysis, dyspnea on exertion  Cardiovascular-negative for chest pain, palpitations, orthopnea, PND, lower extremity edema  GI negative for nausea, vomiting, diarrhea, constipation, melena, bloody stools, GERD  Genitourinary-negative for dysuria, hematuria, urinary retention, urinary frequency  Muscular skeletal-negative for any swollen joints, joint effusions, painful  joints,  Neurological-negative for seizures, negative for dizziness, negative for focal weakness, negative for generalized weakness, negative for numbness or altered sensation  Hematologic-negative for any easy bruising, or bleeding, patient not taking any anticoagulation or antiplatelet medications  Endocrine-negative for any hot or cold intolerance, polydipsia, polyuria,  Skin-negative for any rashes, negative for any changes to the skin, hair changes, or nail changes  Personal History     Past Medical History:   Diagnosis Date   • Cirrhosis of liver (HCC)    • COPD (chronic obstructive pulmonary disease) (HCC)    • Hypertension        Past Surgical History:   Procedure Laterality Date   • PARACENTESIS      abd       Family History: family history is not on file.  Hypertension    Social History:  reports that he has been smoking cigarettes. He has been smoking an average of 1 pack per day. He has never used smokeless tobacco. He reports that he does not currently use alcohol. He reports that he does not currently use drugs.    Home Medications:   QUEtiapine, amLODIPine, buprenorphine-naloxone, doxepin, escitalopram, furosemide, gabapentin, metoprolol succinate XL, naproxen, potassium chloride, and spironolactone    Allergies:  Allergies   Allergen Reactions   • Penicillins Hives       Objective    Objective     Vitals:  Temp:  [97.8 °F (36.6 °C)-99.2 °F (37.3 °C)] 98.9 °F (37.2 °C)  Heart Rate:  [69-81] 81  Resp:  [17-26] 18  BP: (120-137)/(72-95) 132/73  Flow (L/min):  [2] 2    Physical Exam  Vital Signs Reviewed  General WDWN, Alert, NAD.    Chest:  good aeration, clear to auscultation bilaterally, tympanic to percussion bilaterally, no work of breathing noted  CV: RRR, no MGR, .  Patient's abdomen is obese  EXT:  no clubbing, no cyanosis, no edema, no joint tenderness  Neuro:  A&Ox3, CN grossly intact, no focal deficits.  Skin: No rashes or lesions noted  Result Review    Result Review:  I have personally  reviewed the results from the time of this admission to 2/17/2023 16:15 EST and agree with these findings:  [x]  Laboratory  [x]  Microbiology  [x]  Radiology  [x]  EKG/Telemetry   [x]  Cardiology/Vascular   []  Pathology  []  Old records  []  Other:    Most notable findings include: Arterial blood gas showing evidence of hypercapnia    Assessment & Plan   Assessment / Plan     Brief Patient Summary:  Jabari Dixon is a 53 y.o. male who admitted to the hospital with hypercapnia and altered mental status    Active Hospital Problems:  Active Hospital Problems    Diagnosis    • **Altered mental status, unspecified altered mental status type    • Acute on chronic respiratory failure with hypercapnia (HCC)    Respiratory acidosis  CO2 narcosis  Acute COPD exacerbation  Obesity hypoventilation syndrome  Cirrhosis of the liver    Plan:   I suspect that the patient's hypercapnia related to medication and his obesity    Suspect patient most likely does have a certain degree of obstructive sleep apnea not currently taking NIPPV at home    Patient not necessarily the best candidate for home NIPPV due to his homelessness and his current residency in a [employer] house for his drug abuse    At this time spoke with the patient about the importance of avoiding use of Suboxone and Neurontin other sedating medications    We will start patient on Brovana and Pulmicort    As needed nebulizers    Okay from my standpoint to transition to p.o. prednisone    We will wean patient off oxygen     Continue with diuresis    CT scan reviewed and was unremarkable no evidence of any pleural fluid collections or any parenchymal disease    I personally reviewed all imaging, laboratory data, and I spoke with respiratory therapy, and nursing regarding the patient's care, have also spoken with the patient's primary admitting physician regarding his plan of care.      Electronically signed by Yosvany Oneal DO, 02/17/23, 4:15 PM EST.

## 2023-02-17 NOTE — CASE MANAGEMENT/SOCIAL WORK
"RT DEMETRICE spoke with Mr Dixon regarding his COPD diagnosis and disease management.      Mr Dixon states he is currently in recovery and acknowledges that he has a COPD diagnosis but denies that he was in respiratory distress last night.  ABG results, hypercapnia and how it relates to his COPD, BAM and excessive weight (OHS) were discussed as well.      Mr Dixon states he has Advair dpi and Albuterol for rescue but is not compliant with daily use of either, primarily due to cost of medication. The importance of taking Advair daily for maintenance was stressed.  Holding chamber was provided to patient and use of rescue inhaler with spacer was taught.     Patient has outpatient sleep appointment scheduled in May. RT DEMETRICE explained to Mr Dixon that it is imperative that he keeps this appointment and adhere to bilevel/cpap as prescribed due to his high risk of respiratory failure if BAM/OHS is left untreated. Mr Dixon states he understands but is not certain he can comply with use of PAP therapy as it was \"pure Hell\" to him and if it is his to go, then so be it.     Understanding COPD booklet left with patient detailing written COPD action plan and COPD zones.  "

## 2023-02-17 NOTE — PLAN OF CARE
Goal Outcome Evaluation:      Pt is alert and oriented with intermittent confusion.  Pt continued taking bipap on and off throughout the night and later refused to put it back on until he seen a doctor.  Doctor came to floor to see patient and reiterated the importance of wearing this continuously during the night to decrease CO2 being retained.  Pt can be unstable when standing.  Pt. Is currently NPO until bipap is removed.  SHANE RYAN RN

## 2023-02-17 NOTE — THERAPY EVALUATION
Acute Care - Physical Therapy Initial Evaluation   Faustino     Patient Name: Jabari Dixon  : 1970  MRN: [id number]  Today's Date: 2023      Visit Dx:     ICD-10-CM ICD-9-CM   1. Altered mental status, unspecified altered mental status type  R41.82 780.97   2. Acute on chronic congestive heart failure, unspecified heart failure type (HCC)  I50.9 428.0   3. Acute respiratory failure with hypercapnia (HCC)  J96.02 518.81   4. Side effect of medication  T88.7XXA 995.20   5. Difficulty walking  R26.2 719.7     Patient Active Problem List   Diagnosis   • Altered mental status, unspecified altered mental status type   • Acute on chronic respiratory failure with hypercapnia (HCC)     Past Medical History:   Diagnosis Date   • Cirrhosis of liver (HCC)    • COPD (chronic obstructive pulmonary disease) (HCC)    • Hypertension      Past Surgical History:   Procedure Laterality Date   • PARACENTESIS      abd     PT Assessment (last 12 hours)     PT Evaluation and Treatment     Row Name 23 1023          Physical Therapy Time and Intention    Document Type evaluation  -AV     Mode of Treatment individual therapy;physical therapy  -AV     Row Name 23 1023          General Information    Patient Profile Reviewed yes  -AV     Patient Observations alert;cooperative;agree to therapy  -AV     Prior Level of Function independent:;all household mobility;gait;transfer;ADL's  Ambulated without an assistive device. No home O2.  -AV     Equipment Currently Used at Home none  -AV     Existing Precautions/Restrictions no known precautions/restrictions  -AV     Row Name 23 1023          Living Environment    Current Living Arrangements other (see comments)  [facility]  -AV     Row Name 23 1023          Cognition    Orientation Status (Cognition) oriented x 3  -AV     Row Name 23 1023          Range of Motion (ROM)    Range of Motion bilateral lower extremities;ROM is WFL  -AV     Row Name 23  1023          Strength (Manual Muscle Testing)    Strength (Manual Muscle Testing) bilateral lower extremities;strength is WFL  -AV     Row Name 02/17/23 1023          Bed Mobility    Bed Mobility bed mobility (all) activities  -AV     All Activities, [place] (Bed Mobility) independent  -AV     Row Name 02/17/23 1023          Transfers    Transfers sit-stand transfer;stand-sit transfer  -AV     Row Name 02/17/23 1023          Sit-Stand Transfer    Sit-Stand [place] (Transfers) independent  -AV     Assistive Device (Sit-Stand Transfers) --  No AD  -AV     Row Name 02/17/23 1023          Stand-Sit Transfer    Stand-Sit [place] (Transfers) independent  -AV     Assistive Device (Stand-Sit Transfers) --  No AD  -AV     Row Name 02/17/23 1023          Gait/Stairs (Locomotion)    Gait/Stairs Locomotion gait/ambulation independence;gait/ambulation assistive device;distance ambulated  -AV     [place] Level (Gait) independent  -AV     Assistive Device (Gait) --  No AD  -AV     Distance in Feet (Gait) 60  -AV     Pattern (Gait) step-through  -AV     Row Name 02/17/23 1023          Balance    Balance Assessment standing dynamic balance  -AV     Dynamic Standing Balance independent  -AV     Position/Device Used, Standing Balance unsupported  -AV     Row Name 02/17/23 1043          Plan of Care Review    Plan of Care Reviewed With patient  -AV     Progress no change  -AV     Outcome Evaluation Patient not appropriate for skilled PT services at this time as he has not had a decline in functional mobility. Patient safe to continue ambulating in his room until discharge from the hospital. D/C PT order at this time with patient in agreement.  -AV     Row Name 02/17/23 1043          Therapy Assessment/Plan (PT)    Criteria for Skilled Interventions Met (PT) no problems identified which require skilled intervention  -AV     Therapy Frequency (PT) evaluation only  -AV     Row Name 02/17/23 1043          PT Evaluation  Complexity    History, PT Evaluation Complexity 1-2 personal factors and/or comorbidities  -AV     Examination of Body Systems (PT Eval Complexity) total of 4 or more elements  -AV     Clinical Presentation (PT Evaluation Complexity) stable  -AV     Clinical Decision Making (PT Evaluation Complexity) low complexity  -AV     Overall Complexity (PT Evaluation Complexity) low complexity  -AV     Row Name 02/17/23 1043          Therapy Plan Review/Discharge Plan (PT)    Therapy Plan Review (PT) evaluation/treatment results reviewed;patient  -AV           User Key  (r) = Recorded By, (t) = Taken By, (c) = Cosigned By    Initials Name Provider Type    AV Ervin Matthews, PT Physical Therapist                Physical Therapy Education     Title: PT OT SLP Therapies (In Progress)     Topic: Physical Therapy (In Progress)     Point: Mobility training (Done)     Learning Progress Summary           Patient Acceptance, E,TB, VU by AV at 2/17/2023 1045                   Point: Home exercise program (Not Started)     Learner Progress:  Not documented in this visit.          Point: Body mechanics (Not Started)     Learner Progress:  Not documented in this visit.          Point: Precautions (Not Started)     Learner Progress:  Not documented in this visit.                      User Key     Initials Effective Dates Name Provider Type Discipline    AV 06/11/21 -  Ervin Matthews, PT Physical Therapist PT              PT Recommendation and Plan  Anticipated Discharge Disposition (PT): home  Therapy Frequency (PT): evaluation only  Plan of Care Reviewed With: patient  Progress: no change  Outcome Evaluation: Patient not appropriate for skilled PT services at this time as he has not had a decline in functional mobility. Patient safe to continue ambulating in his room until discharge from the hospital. D/C PT order at this time with patient in agreement.   Outcome Measures     Row Name 02/17/23 1000             How much help from  another person do you currently need...    Turning from your back to your side while in flat bed without using bedrails? 4  -AV      Moving from lying on back to sitting on the side of a flat bed without bedrails? 4  -AV      Moving to and from a bed to a chair (including a wheelchair)? 4  -AV      Standing up from a chair using your arms (e.g., wheelchair, bedside chair)? 4  -AV      Climbing 3-5 steps with a railing? 4  -AV      To walk in hospital room? 4  -AV      AM-PAC 6 Clicks Score (PT) 24  -AV         Functional Assessment    Outcome Measure Options AM-PAC 6 Clicks Basic Mobility (PT)  -AV            User Key  (r) = Recorded By, (t) = Taken By, (c) = Cosigned By    Initials Name Provider Type    Ervin Naik, PT Physical Therapist                 Time Calculation:    PT Charges     Row Name 02/17/23 1044             Time Calculation    PT Received On 02/17/23  -AV         Untimed Charges    PT Eval/Re-eval Minutes 25  -AV         Total Minutes    Untimed Charges Total Minutes 25  -AV       Total Minutes 25  -AV            User Key  (r) = Recorded By, (t) = Taken By, (c) = Cosigned By    Initials Name Provider Type    Ervin Naik, PT Physical Therapist              Therapy Charges for Today     Code Description Service Date Service Provider Modifiers Qty    13665695744 HC PT EVAL LOW COMPLEXITY 2 2/17/2023 Ervin Matthews, PT GP 1          PT G-Codes  Outcome Measure Options: AM-PAC 6 Clicks Basic Mobility (PT)  AM-PAC 6 Clicks Score (PT): 24    Ervin Matthews PT  2/17/2023

## 2023-02-17 NOTE — NURSING NOTE
"Patient requesting to be discharged most of the day as well as asking to be taken out to smoke. Patient refusing to keep bipap/O2, cont pulse ox, and heart monitor on.  This nurse tried to educate the patient of the importance of staying and continuing with current treatments and diagnostics, as well as the fact that this is a smoke free facility. Patient said he was  \"recovering\" and had to get to the home for a meeting and to get his medications. I informed his attending when the patient said he was just going to leave. Patient is not medically ready to be discharged at this time. After multiple attempts from staff to explain and educate the patient ,patient stated he was leaving and signed AMA papers. Kely Ramirez RN    "

## 2023-02-18 NOTE — OUTREACH NOTE
Prep Survey    Flowsheet Row Responses   [facility] facility patient discharged from? [facility]   Is LACE score < 7 ? No   Eligibility Not Eligible   Does the patient have one of the following disease processes/diagnoses(primary or secondary)? Other   Prep survey completed? Yes          Joanie JIMENEZ - Registered Nurse

## 2023-02-19 ENCOUNTER — HOSPITAL ENCOUNTER (OUTPATIENT)
Facility: HOSPITAL | Age: 53
Setting detail: OBSERVATION
Discharge: NURSING, REHAB OR LONG-TERM CARE | End: 2023-02-20
Attending: EMERGENCY MEDICINE | Admitting: INTERNAL MEDICINE
Payer: MEDICARE

## 2023-02-19 ENCOUNTER — APPOINTMENT (OUTPATIENT)
Dept: GENERAL RADIOLOGY | Facility: HOSPITAL | Age: 53
End: 2023-02-19
Payer: MEDICARE

## 2023-02-19 DIAGNOSIS — I50.9 CHRONIC CONGESTIVE HEART FAILURE, UNSPECIFIED HEART FAILURE TYPE: ICD-10-CM

## 2023-02-19 DIAGNOSIS — R41.82 ALTERED MENTAL STATUS, UNSPECIFIED ALTERED MENTAL STATUS TYPE: Primary | ICD-10-CM

## 2023-02-19 DIAGNOSIS — J96.02 ACUTE RESPIRATORY FAILURE WITH HYPERCAPNIA: ICD-10-CM

## 2023-02-19 PROBLEM — J96.22 ACUTE ON CHRONIC RESPIRATORY FAILURE WITH HYPOXIA AND HYPERCAPNIA: Status: ACTIVE | Noted: 2023-02-19

## 2023-02-19 PROBLEM — J96.21 ACUTE ON CHRONIC RESPIRATORY FAILURE WITH HYPOXIA AND HYPERCAPNIA: Status: ACTIVE | Noted: 2023-02-19

## 2023-02-19 LAB
ALBUMIN SERPL-MCNC: 4 G/DL (ref 3.5–5.2)
ALBUMIN/GLOB SERPL: 1 G/DL
ALP SERPL-CCNC: 100 U/L (ref 39–117)
ALT SERPL W P-5'-P-CCNC: 17 U/L (ref 1–41)
ANION GAP SERPL CALCULATED.3IONS-SCNC: 8.8 MMOL/L (ref 5–15)
ARTERIAL PATENCY WRIST A: POSITIVE
AST SERPL-CCNC: 22 U/L (ref 1–40)
BASE EXCESS BLDA CALC-SCNC: 2.6 MMOL/L (ref -2–2)
BASOPHILS # BLD AUTO: 0.04 10*3/MM3 (ref 0–0.2)
BASOPHILS NFR BLD AUTO: 0.5 % (ref 0–1.5)
BDY SITE: ABNORMAL
BILIRUB SERPL-MCNC: 0.6 MG/DL (ref 0–1.2)
BUN SERPL-MCNC: 27 MG/DL (ref 6–20)
BUN/CREAT SERPL: 24.5 (ref 7–25)
CA-I BLDA-SCNC: 1.15 MMOL/L (ref 1.13–1.32)
CALCIUM SPEC-SCNC: 8.9 MG/DL (ref 8.6–10.5)
CHLORIDE BLDA-SCNC: 95 MMOL/L (ref 98–106)
CHLORIDE SERPL-SCNC: 95 MMOL/L (ref 98–107)
CO2 SERPL-SCNC: 31.2 MMOL/L (ref 22–29)
COHGB MFR BLD: 4.5 % (ref 0–1.5)
CREAT SERPL-MCNC: 1.1 MG/DL (ref 0.76–1.27)
DEPRECATED RDW RBC AUTO: 49.5 FL (ref 37–54)
EGFRCR SERPLBLD CKD-EPI 2021: 79.3 ML/MIN/1.73
EOSINOPHIL # BLD AUTO: 0.34 10*3/MM3 (ref 0–0.4)
EOSINOPHIL NFR BLD AUTO: 4.5 % (ref 0.3–6.2)
ERYTHROCYTE [DISTWIDTH] IN BLOOD BY AUTOMATED COUNT: 13.9 % (ref 12.3–15.4)
FHHB: 8.3 % (ref 0–5)
GAS FLOW AIRWAY: 2 LPM
GEN 5 2HR TROPONIN T REFLEX: 7 NG/L
GLOBULIN UR ELPH-MCNC: 4.2 GM/DL
GLUCOSE BLDA-MCNC: 108 MG/DL (ref 70–99)
GLUCOSE SERPL-MCNC: 115 MG/DL (ref 65–99)
HCO3 BLDA-SCNC: 30.3 MMOL/L (ref 22–26)
HCT VFR BLD AUTO: 40.5 % (ref 37.5–51)
HGB BLD-MCNC: 13.2 G/DL (ref 13–17.7)
HGB BLDA-MCNC: 14.4 G/DL (ref 13.8–16.4)
HOLD SPECIMEN: NORMAL
HOLD SPECIMEN: NORMAL
IMM GRANULOCYTES # BLD AUTO: 0.05 10*3/MM3 (ref 0–0.05)
IMM GRANULOCYTES NFR BLD AUTO: 0.7 % (ref 0–0.5)
INHALED O2 CONCENTRATION: 28 %
LACTATE BLDA-SCNC: 0.88 MMOL/L (ref 0.5–2)
LYMPHOCYTES # BLD AUTO: 1.41 10*3/MM3 (ref 0.7–3.1)
LYMPHOCYTES NFR BLD AUTO: 18.6 % (ref 19.6–45.3)
MCH RBC QN AUTO: 31.4 PG (ref 26.6–33)
MCHC RBC AUTO-ENTMCNC: 32.6 G/DL (ref 31.5–35.7)
MCV RBC AUTO: 96.2 FL (ref 79–97)
METHGB BLD QL: 0 % (ref 0–1.5)
MODALITY: ABNORMAL
MONOCYTES # BLD AUTO: 1.17 10*3/MM3 (ref 0.1–0.9)
MONOCYTES NFR BLD AUTO: 15.4 % (ref 5–12)
NEUTROPHILS NFR BLD AUTO: 4.57 10*3/MM3 (ref 1.7–7)
NEUTROPHILS NFR BLD AUTO: 60.3 % (ref 42.7–76)
NRBC BLD AUTO-RTO: 0 /100 WBC (ref 0–0.2)
NT-PROBNP SERPL-MCNC: 381 PG/ML (ref 0–900)
OXYHGB MFR BLDV: 87.2 % (ref 94–99)
PCO2 BLDA: 60.1 MM HG (ref 35–45)
PH BLDA: 7.32 PH UNITS (ref 7.35–7.45)
PLATELET # BLD AUTO: 129 10*3/MM3 (ref 140–450)
PMV BLD AUTO: 11.5 FL (ref 6–12)
PO2 BLD: 229 MM[HG] (ref 0–500)
PO2 BLDA: 64.1 MM HG (ref 80–100)
POTASSIUM BLDA-SCNC: 3.78 MMOL/L (ref 3.5–5)
POTASSIUM SERPL-SCNC: 3.7 MMOL/L (ref 3.5–5.2)
PROT SERPL-MCNC: 8.2 G/DL (ref 6–8.5)
RBC # BLD AUTO: 4.21 10*6/MM3 (ref 4.14–5.8)
SAO2 % BLDCOA: 91.3 % (ref 95–99)
SODIUM BLDA-SCNC: 138 MMOL/L (ref 136–146)
SODIUM SERPL-SCNC: 135 MMOL/L (ref 136–145)
TROPONIN T DELTA: -1 NG/L
TROPONIN T SERPL HS-MCNC: 8 NG/L
WBC NRBC COR # BLD: 7.58 10*3/MM3 (ref 3.4–10.8)
WHOLE BLOOD HOLD COAG: NORMAL
WHOLE BLOOD HOLD SPECIMEN: NORMAL

## 2023-02-19 PROCEDURE — 83050 HGB METHEMOGLOBIN QUAN: CPT | Performed by: EMERGENCY MEDICINE

## 2023-02-19 PROCEDURE — G0378 HOSPITAL OBSERVATION PER HR: HCPCS

## 2023-02-19 PROCEDURE — 80053 COMPREHEN METABOLIC PANEL: CPT

## 2023-02-19 PROCEDURE — 93010 ELECTROCARDIOGRAM REPORT: CPT | Performed by: SPECIALIST

## 2023-02-19 PROCEDURE — 84484 ASSAY OF TROPONIN QUANT: CPT

## 2023-02-19 PROCEDURE — 94799 UNLISTED PULMONARY SVC/PX: CPT

## 2023-02-19 PROCEDURE — 71045 X-RAY EXAM CHEST 1 VIEW: CPT

## 2023-02-19 PROCEDURE — 99285 EMERGENCY DEPT VISIT HI MDM: CPT

## 2023-02-19 PROCEDURE — 36415 COLL VENOUS BLD VENIPUNCTURE: CPT

## 2023-02-19 PROCEDURE — 93005 ELECTROCARDIOGRAM TRACING: CPT

## 2023-02-19 PROCEDURE — 83880 ASSAY OF NATRIURETIC PEPTIDE: CPT

## 2023-02-19 PROCEDURE — 82805 BLOOD GASES W/O2 SATURATION: CPT | Performed by: EMERGENCY MEDICINE

## 2023-02-19 PROCEDURE — 96374 THER/PROPH/DIAG INJ IV PUSH: CPT

## 2023-02-19 PROCEDURE — 25010000002 FUROSEMIDE PER 20 MG: Performed by: EMERGENCY MEDICINE

## 2023-02-19 PROCEDURE — 94660 CPAP INITIATION&MGMT: CPT

## 2023-02-19 PROCEDURE — 82375 ASSAY CARBOXYHB QUANT: CPT | Performed by: EMERGENCY MEDICINE

## 2023-02-19 PROCEDURE — 99222 1ST HOSP IP/OBS MODERATE 55: CPT | Performed by: FAMILY MEDICINE

## 2023-02-19 PROCEDURE — 36600 WITHDRAWAL OF ARTERIAL BLOOD: CPT | Performed by: EMERGENCY MEDICINE

## 2023-02-19 PROCEDURE — 85025 COMPLETE CBC W/AUTO DIFF WBC: CPT

## 2023-02-19 RX ORDER — HEPARIN SODIUM 5000 [USP'U]/ML
5000 INJECTION, SOLUTION INTRAVENOUS; SUBCUTANEOUS EVERY 12 HOURS SCHEDULED
Status: DISCONTINUED | OUTPATIENT
Start: 2023-02-20 | End: 2023-02-20 | Stop reason: HOSPADM

## 2023-02-19 RX ORDER — NITROGLYCERIN 0.4 MG/1
0.4 TABLET SUBLINGUAL
Status: DISCONTINUED | OUTPATIENT
Start: 2023-02-19 | End: 2023-02-20 | Stop reason: HOSPADM

## 2023-02-19 RX ORDER — FUROSEMIDE 10 MG/ML
60 INJECTION INTRAMUSCULAR; INTRAVENOUS ONCE
Status: COMPLETED | OUTPATIENT
Start: 2023-02-19 | End: 2023-02-19

## 2023-02-19 RX ORDER — NICOTINE 21 MG/24HR
1 PATCH, TRANSDERMAL 24 HOURS TRANSDERMAL
Status: DISCONTINUED | OUTPATIENT
Start: 2023-02-20 | End: 2023-02-20

## 2023-02-19 RX ORDER — SODIUM CHLORIDE 0.9 % (FLUSH) 0.9 %
10 SYRINGE (ML) INJECTION AS NEEDED
Status: DISCONTINUED | OUTPATIENT
Start: 2023-02-19 | End: 2023-02-20 | Stop reason: HOSPADM

## 2023-02-19 RX ORDER — SODIUM CHLORIDE 9 MG/ML
40 INJECTION, SOLUTION INTRAVENOUS AS NEEDED
Status: DISCONTINUED | OUTPATIENT
Start: 2023-02-19 | End: 2023-02-20 | Stop reason: HOSPADM

## 2023-02-19 RX ORDER — SODIUM CHLORIDE 0.9 % (FLUSH) 0.9 %
10 SYRINGE (ML) INJECTION EVERY 12 HOURS SCHEDULED
Status: DISCONTINUED | OUTPATIENT
Start: 2023-02-20 | End: 2023-02-20 | Stop reason: HOSPADM

## 2023-02-19 RX ORDER — IPRATROPIUM BROMIDE AND ALBUTEROL SULFATE 2.5; .5 MG/3ML; MG/3ML
3 SOLUTION RESPIRATORY (INHALATION)
Status: DISCONTINUED | OUTPATIENT
Start: 2023-02-20 | End: 2023-02-20 | Stop reason: HOSPADM

## 2023-02-19 RX ADMIN — FUROSEMIDE 60 MG: 10 INJECTION, SOLUTION INTRAMUSCULAR; INTRAVENOUS at 17:32

## 2023-02-19 RX ADMIN — Medication 10 ML: at 17:35

## 2023-02-19 NOTE — SIGNIFICANT NOTE
02/19/23 1750   Plan   Plan Comments MALLIKA spoke with pt at bedside regarding sober living home. pt states that he was discharged from his sober living and it wasn't his fault and thinks he doesn't have his bed there anymore. He wants to maintain his sobriety and wants to go back to this sober living home. pt called ahmet at the [facility] and ahmet informed pt that he was not welcome back to the sober living facility because he left the hospital AMA. SW requested to call Ahmet and talk to him. MALLIKA spoke with Ahmet who informed SW that pt was not allowed back to sober living facility becasue he left AMA at the hospital. He was part of a partial hospitalization program through [facility] and when admitted to the hospital, he left AMA. Moises reports that they have no idea what pt did as he wasn't being monitored and because of this he is not allowed back to his sober living facility. provider updated.   Final Discharge Disposition Code 30 - still a patient

## 2023-02-19 NOTE — ED PROVIDER NOTES
Time: 2:04 PM EST  Date of encounter:  2/19/2023  Independent Historian/Clinical History and Information was obtained by:   Patient and EMS  Chief Complaint: SOB    History is limited by: N/A    History of Present Illness:  Patient is a 53 y.o. year old male who presents to the emergency department for evaluation of leg swelling for the past several days. Pt also presents with severe SOB by report. Pt is very lethargic on my exam, but denies taking any drugs that are not prescribed to him. Pt currently has taken Lasix, Neurontin and Suboxone and more recently was on Seroquel. Pt was recently admitted to hospital a few days ago on BIPAP for AMS and CO2 retention, and Pt left AMA 2 days ago.     He was just told by his sober living facility that he is now being released from their care and cannot stay there as he left the hospital AMA this week.        HPI    Patient Care Team  Primary Care Provider: Ernesto Cerda MD    Past Medical History:     Allergies   Allergen Reactions   • Penicillins Hives     Past Medical History:   Diagnosis Date   • Cirrhosis of liver (HCC)    • COPD (chronic obstructive pulmonary disease) (HCC)    • Hypertension      Past Surgical History:   Procedure Laterality Date   • PARACENTESIS      abd     History reviewed. No pertinent family history.    Home Medications:  Prior to Admission medications    Medication Sig Start Date End Date Taking? Authorizing Provider   amLODIPine (NORVASC) 5 MG tablet Take 5 mg by mouth Daily.    ProviderMila MD   buprenorphine-naloxone (SUBOXONE) 8-2 MG per SL tablet Place 1 tablet under the tongue Daily. 2/15/23   Mila Wooten MD   doxepin (SINEquan) 50 MG capsule Take 50 mg by mouth every night at bedtime. 2/6/23   Mila Wooten MD   escitalopram (LEXAPRO) 20 MG tablet Take 20 mg by mouth Every Morning. 2/6/23   Mila Wooten MD   furosemide (LASIX) 40 MG tablet Take 40 mg by mouth 3 (Three) Times a Day.    Je  "MD Mila   gabapentin (NEURONTIN) 600 MG tablet Take 1 tablet by mouth 3 (Three) Times a Day. 2/15/23   Mila Wooten MD   metoprolol succinate XL (TOPROL-XL) 50 MG 24 hr tablet Take 1 tablet by mouth Daily. 1/20/23   Mila Wooten MD   naproxen (NAPROSYN) 500 MG tablet Take 500 mg by mouth 2 (Two) Times a Day As Needed.    Mila Wooten MD   potassium chloride (K-DUR,KLOR-CON) 20 MEQ CR tablet Take 1 tablet by mouth 2 (Two) Times a Day. 2/15/23   Mila Wooten MD   QUEtiapine (SEROquel) 400 MG tablet Take 400 mg by mouth every night at bedtime. 2/6/23   Mila Wooten MD   spironolactone (ALDACTONE) 50 MG tablet Take 1 tablet by mouth 2 (Two) Times a Day. 2/15/23   Mila Wooten MD        Social History:   Social History     Tobacco Use   • Smoking status: Every Day     Packs/day: 1.00     Types: Cigarettes   • Smokeless tobacco: Never   Vaping Use   • Vaping Use: Never used   Substance Use Topics   • Alcohol use: Not Currently     Comment: recovering alcholic   • Drug use: Not Currently     Types: Methamphetamines, IV         Review of Systems:  Review of Systems   Constitutional: Negative for chills and fever.   HENT: Negative for congestion, ear pain and sore throat.    Eyes: Negative for pain.   Respiratory: Positive for shortness of breath. Negative for cough and chest tightness.    Cardiovascular: Positive for leg swelling. Negative for chest pain.   Gastrointestinal: Negative for abdominal pain, diarrhea, nausea and vomiting.   Genitourinary: Negative for flank pain and hematuria.   Musculoskeletal: Negative for joint swelling.   Skin: Negative for pallor.   Neurological: Negative for seizures and headaches.   All other systems reviewed and are negative.       Physical Exam:  /84   Pulse 80   Temp 98.2 °F (36.8 °C) (Oral)   Resp 14   Ht 170.2 cm (67\")   Wt 124 kg (272 lb 11.3 oz)   SpO2 94%   BMI 42.71 kg/m²     Physical Exam  Vitals and " nursing note reviewed.   Constitutional:       General: He is not in acute distress.     Appearance: Normal appearance. He is not toxic-appearing.   HENT:      Head: Normocephalic and atraumatic.      Mouth/Throat:      Mouth: Mucous membranes are moist.   Eyes:      General: No scleral icterus.  Cardiovascular:      Rate and Rhythm: Normal rate and regular rhythm.      Pulses: Normal pulses.      Heart sounds: Normal heart sounds.   Pulmonary:      Effort: Pulmonary effort is normal. No respiratory distress.      Breath sounds: Wheezing present.   Abdominal:      General: Abdomen is flat.      Palpations: Abdomen is soft.      Tenderness: There is no abdominal tenderness.   Musculoskeletal:         General: Normal range of motion.      Cervical back: Normal range of motion and neck supple.      Right lower leg: Edema (chronic) present.      Left lower leg: Edema (chronic) present.   Skin:     General: Skin is warm and dry.   Neurological:      Mental Status: He is oriented to person, place, and time. Mental status is at baseline. He is lethargic.      Comments: Mildly lethargic but arousable and conversing normally                  Procedures:  Procedures      Medical Decision Making:      Comorbidities that affect care:    COPD, CHF    External Notes reviewed:    EMS notes, past ED notes, past EKG, past discharge summary      The following orders were placed and all results were independently analyzed by me:  Orders Placed This Encounter   Procedures   • XR Chest 1 View   • Entriken Draw   • Comprehensive Metabolic Panel   • BNP   • High Sensitivity Troponin T   • CBC Auto Differential   • High Sensitivity Troponin T 2Hr   • ABG with Co-Ox and Electrolytes   • NPO Diet NPO Type: Strict NPO   • Undress & Gown   • Cardiac Monitoring   • Continuous Pulse Oximetry   • Vital Signs   • Hospitalist (on-call MD unless specified)   • Hospitalist (on-call MD unless specified)   • Oxygen Therapy- Nasal Cannula; 2 LPM; Titrate  for SPO2: equal to or greater than, 92%   • ECG 12 Lead ED Triage Standing Order; SOA   • Insert Peripheral IV   • CBC & Differential   • Green Top (Gel)   • Lavender Top   • Gold Top - SST   • Light Blue Top       Medications Given in the Emergency Department:  Medications   sodium chloride 0.9 % flush 10 mL (10 mL Intravenous Given 2/19/23 1735)   furosemide (LASIX) injection 60 mg (60 mg Intravenous Given 2/19/23 1732)        ED Course:    ED Course as of 02/19/23 2212   Sun Feb 19, 2023   1421 EKG: I reviewed and interpreted his twelve-lead EKG is normal sinus rhythm at 75 bpm, normal P waves, normal QRS, normal ST segments and T waves; no acute ischemia or ectopy. [VS]      ED Course User Index  [VS] Thor David MD       Labs:    Lab Results (last 24 hours)     Procedure Component Value Units Date/Time    CBC & Differential [[id number]]  (Abnormal) Collected: 02/19/23 1500    Specimen: Blood Updated: 02/19/23 1520    Narrative:      The following orders were created for panel order CBC & Differential.  Procedure                               Abnormality         Status                     ---------                               -----------         ------                     CBC Auto Differential[[id number]]        Abnormal            Final result               Scan Slide[[id number]]                                                                    Please view results for these tests on the individual orders.    Comprehensive Metabolic Panel [[id number]]  (Abnormal) Collected: 02/19/23 1500    Specimen: Blood Updated: 02/19/23 1600     Glucose 115 mg/dL      BUN 27 mg/dL      Creatinine 1.10 mg/dL      Sodium 135 mmol/L      Potassium 3.7 mmol/L      Chloride 95 mmol/L      CO2 31.2 mmol/L      Calcium 8.9 mg/dL      Total Protein 8.2 g/dL      Albumin 4.0 g/dL      ALT (SGPT) 17 U/L      AST (SGOT) 22 U/L      Alkaline Phosphatase 100 U/L      Total Bilirubin 0.6 mg/dL      Globulin 4.2 gm/dL      A/G Ratio 1.0  g/dL      BUN/Creatinine Ratio 24.5     Anion Gap 8.8 mmol/L      eGFR 79.3 mL/min/1.73     Narrative:      GFR Normal >60  Chronic Kidney Disease <60  Kidney Failure <15      BNP [[id number]]  (Normal) Collected: 02/19/23 1500    Specimen: Blood Updated: 02/19/23 1534     proBNP 381.0 pg/mL     Narrative:      Among patients with dyspnea, NT-proBNP is highly sensitive for the detection of acute congestive heart failure. In addition NT-proBNP of <300 pg/ml effectively rules out acute congestive heart failure with 99% negative predictive value.    Results may be falsely decreased if patient taking Biotin.      High Sensitivity Troponin T [[id number]]  (Normal) Collected: 02/19/23 1500    Specimen: Blood Updated: 02/19/23 1535     HS Troponin T 8 ng/L     Narrative:      High Sensitive Troponin T Reference Range:  <10.0 ng/L- Negative Female for AMI  <15.0 ng/L- Negative Male for AMI  >=10 - Abnormal Female indicating possible myocardial injury.  >=15 - Abnormal Male indicating possible myocardial injury.   Clinicians would have to utilize clinical acumen, EKG, Troponin, and serial changes to determine if it is an Acute Myocardial Infarction or myocardial injury due to an underlying chronic condition.         CBC Auto Differential [[id number]]  (Abnormal) Collected: 02/19/23 1500    Specimen: Blood Updated: 02/19/23 1520     WBC 7.58 10*3/mm3      RBC 4.21 10*6/mm3      Hemoglobin 13.2 g/dL      Hematocrit 40.5 %      MCV 96.2 fL      MCH 31.4 pg      MCHC 32.6 g/dL      RDW 13.9 %      RDW-SD 49.5 fl      MPV 11.5 fL      Platelets 129 10*3/mm3      Neutrophil % 60.3 %      Lymphocyte % 18.6 %      Monocyte % 15.4 %      Eosinophil % 4.5 %      Basophil % 0.5 %      Immature Grans % 0.7 %      Neutrophils, Absolute 4.57 10*3/mm3      Lymphocytes, Absolute 1.41 10*3/mm3      Monocytes, Absolute 1.17 10*3/mm3      Eosinophils, Absolute 0.34 10*3/mm3      Basophils, Absolute 0.04 10*3/mm3      Immature Grans, Absolute 0.05  10*3/mm3      nRBC 0.0 /100 WBC     ABG with Co-Ox and Electrolytes [[id number]]  (Abnormal) Collected: 02/19/23 1556    Specimen: Arterial Blood from Arm, Left Updated: 02/19/23 1628     pH, Arterial 7.320 pH units      pCO2, Arterial 60.1 mm Hg      pO2, Arterial 64.1 mm Hg      HCO3, Arterial 30.3 mmol/L      Base Excess, Arterial 2.6 mmol/L      O2 Saturation, Arterial 91.3 %      Hemoglobin, Blood Gas 14.4 g/dL      Carboxyhemoglobin 4.5 %      Methemoglobin 0.00 %      Oxyhemoglobin 87.2 %      FHHB 8.3 %      Alex's Test Positive     Site Arterial: left radial     Modality Nasal Cannula     FIO2 28 %      Flow Rate 2 lpm      Sodium, Arterial 138.0 mmol/L      Potassium, Arterial 3.78 mmol/L      Ionized Calcium, Arterial 1.15 mmol/L      Chloride, Arterial 95 mmol/L      Glucose, Arterial 108 mg/dL      Lactate, Arterial 0.88 mmol/L      PO2/FIO2 229    High Sensitivity Troponin T 2Hr [[id number]]  (Normal) Collected: 02/19/23 1735    Specimen: Blood Updated: 02/19/23 1807     HS Troponin T 7 ng/L      Troponin T Delta -1 ng/L     Narrative:      High Sensitive Troponin T Reference Range:  <10.0 ng/L- Negative Female for AMI  <15.0 ng/L- Negative Male for AMI  >=10 - Abnormal Female indicating possible myocardial injury.  >=15 - Abnormal Male indicating possible myocardial injury.   Clinicians would have to utilize clinical acumen, EKG, Troponin, and serial changes to determine if it is an Acute Myocardial Infarction or myocardial injury due to an underlying chronic condition.                Imaging:    XR Chest 1 View    Result Date: 2/19/2023  PROCEDURE: XR CHEST 1 VW  COMPARISON: [facility], CR, XR CHEST 1 VW, 2/16/2023, 14:53.  INDICATIONS: SOA Triage Protocol  FINDINGS:  The heart remains borderline in size.  The pulmonary vascularity does not appear congested.  The lungs are well-expanded and free of infiltrates.  Bony structures appear intact.  1 cm metallic density is again seen in the  left shoulder region.       No active disease is seen.       TANVIR MILLER MD       Electronically Signed and Approved By: TANVIR MILLER MD on 2/19/2023 at 14:36                 Differential Diagnosis and Discussion:    Dyspnea: Differential diagnosis includes but is not limited to metabolic acidosis, neurological disorders, psychogenic, asthma, pneumothorax, upper airway obstruction, COPD, pneumonia, noncardiogenic pulmonary edema, interstitial lung disease, anemia, congestive heart failure, and pulmonary embolism    All labs were reviewed and interpreted by me.  All X-rays were independently reviewed by me.  EKG was interpreted by me.    MDM           This patient is a 53-year-old male with history of previous substance abuse currently residing at local sober living facility ([facility]) who is essentially now being released from their care since he left the hospital AMA earlier this week.    Patient had episode of severe lethargy here and has obvious difficulty breathing due to lethargy.    I checked an ABG and he was showing signs of respiratory acidosis and retaining and I started him on BiPAP.    Shortly thereafter he was more alert and responsive and talkative.    We attempted to get him placed in another facility over at [facility] who did have an available bed.    The problem is that he does not have orders for CPAP and has not undergone sleep study and I suspect with multiple sedating medications and with probable sleep apnea and COPD, he will be once again sent back to the ED for acute on chronic hypoxic and hypercapnic respiratory failure requiring BiPAP.              Critical Care Note: Total Critical Care time of 35 minutes. Total critical care time documented does not include time spent on separately billed procedures for services of nurses or physician assistants. I personally saw and examined the patient. I have reviewed all diagnostic interpretations and treatment plans as  written. I was present for the key portions of any procedures performed and the inclusive time noted in any critical care statement. Critical care time includes patient management by me, time spent at the patients bedside,  time to review lab and imaging results, discussing patient care, documentation in the medical record, and time spent with family or caregiver.    Patient Care Considerations:          Consultants/Shared Management Plan:    Hospitalist: I have discussed the case with The admitting hospitalist who agrees to accept the patient for admission.    Social Determinants of Health:    Patient is independent, reliable, and has access to care.       Disposition and Care Coordination:    Admit:   Through independent evaluation of the patient's history, physical, and imperical data, the patient meets criteria for observation/admission to the hospital.        Final diagnoses:   Altered mental status, unspecified altered mental status type   Chronic congestive heart failure, unspecified heart failure type (HCC)   Acute respiratory failure with hypercapnia (HCC)        ED Disposition     ED Disposition   Decision to Admit    Condition   --    Comment   --             This medical record created using voice recognition software.             Malcolm Lopez  02/19/23 7093       Thor David MD  02/19/23 9089

## 2023-02-19 NOTE — SIGNIFICANT NOTE
02/19/23 1428   Plan   Plan Comments MAI contacted [facility] per provider request. house [facility] did not answer phone call and mai left voicemail for a return call.   Final Discharge Disposition Code 30 - still a patient

## 2023-02-19 NOTE — SIGNIFICANT NOTE
02/19/23 1533   Plan   Plan Comments SW attempted to talk to pt twice per provider request; however, pt is asleep and will not wake up for SW. SW attempted twice with no luck. SW informed provider and nurse.   Final Discharge Disposition Code 30 - still a patient

## 2023-02-19 NOTE — SIGNIFICANT NOTE
02/19/23 160   Plan   Plan Comments SW attempted to talk to pt again and no luck.   Final Discharge Disposition Code 30 - still a patient

## 2023-02-19 NOTE — SIGNIFICANT NOTE
02/19/23 1820   Plan   Plan Comments SW informed pt that Chema Day with [facility] in [place], [place] is needing pt to call [phone] to complete a phone screening.   Final Discharge Disposition Code 30 - still a patient

## 2023-02-20 VITALS
SYSTOLIC BLOOD PRESSURE: 122 MMHG | HEIGHT: 67 IN | WEIGHT: 268.3 LBS | DIASTOLIC BLOOD PRESSURE: 69 MMHG | BODY MASS INDEX: 42.11 KG/M2 | HEART RATE: 61 BPM | OXYGEN SATURATION: 93 % | TEMPERATURE: 98.7 F | RESPIRATION RATE: 18 BRPM

## 2023-02-20 LAB
ANION GAP SERPL CALCULATED.3IONS-SCNC: 8.3 MMOL/L (ref 5–15)
BASE EXCESS BLDA CALC-SCNC: 4.6 MMOL/L (ref -2–2)
BASOPHILS # BLD AUTO: 0.03 10*3/MM3 (ref 0–0.2)
BASOPHILS NFR BLD AUTO: 0.6 % (ref 0–1.5)
BDY SITE: ABNORMAL
BUN SERPL-MCNC: 22 MG/DL (ref 6–20)
BUN/CREAT SERPL: 21.2 (ref 7–25)
CALCIUM SPEC-SCNC: 9.1 MG/DL (ref 8.6–10.5)
CHLORIDE SERPL-SCNC: 97 MMOL/L (ref 98–107)
CO2 SERPL-SCNC: 30.7 MMOL/L (ref 22–29)
COHGB MFR BLD: 2.3 % (ref 0–1.5)
CREAT SERPL-MCNC: 1.04 MG/DL (ref 0.76–1.27)
DEPRECATED RDW RBC AUTO: 48.8 FL (ref 37–54)
EGFRCR SERPLBLD CKD-EPI 2021: 84.8 ML/MIN/1.73
EOSINOPHIL # BLD AUTO: 0.37 10*3/MM3 (ref 0–0.4)
EOSINOPHIL NFR BLD AUTO: 7.2 % (ref 0.3–6.2)
ERYTHROCYTE [DISTWIDTH] IN BLOOD BY AUTOMATED COUNT: 14 % (ref 12.3–15.4)
FHHB: 4.1 % (ref 0–5)
GAS FLOW AIRWAY: 4 LPM
GLUCOSE SERPL-MCNC: 121 MG/DL (ref 65–99)
HCO3 BLDA-SCNC: 31.6 MMOL/L (ref 22–26)
HCT VFR BLD AUTO: 40.5 % (ref 37.5–51)
HGB BLD-MCNC: 13.1 G/DL (ref 13–17.7)
HGB BLDA-MCNC: 15.2 G/DL (ref 13.8–16.4)
IMM GRANULOCYTES # BLD AUTO: 0.03 10*3/MM3 (ref 0–0.05)
IMM GRANULOCYTES NFR BLD AUTO: 0.6 % (ref 0–0.5)
INHALED O2 CONCENTRATION: 36 %
LACTATE BLDA-SCNC: ABNORMAL MMOL/L
LYMPHOCYTES # BLD AUTO: 1.34 10*3/MM3 (ref 0.7–3.1)
LYMPHOCYTES NFR BLD AUTO: 26.2 % (ref 19.6–45.3)
MCH RBC QN AUTO: 30.7 PG (ref 26.6–33)
MCHC RBC AUTO-ENTMCNC: 32.3 G/DL (ref 31.5–35.7)
MCV RBC AUTO: 94.8 FL (ref 79–97)
METHGB BLD QL: 0.2 % (ref 0–1.5)
MODALITY: ABNORMAL
MONOCYTES # BLD AUTO: 0.75 10*3/MM3 (ref 0.1–0.9)
MONOCYTES NFR BLD AUTO: 14.6 % (ref 5–12)
NEUTROPHILS NFR BLD AUTO: 2.6 10*3/MM3 (ref 1.7–7)
NEUTROPHILS NFR BLD AUTO: 50.8 % (ref 42.7–76)
NRBC BLD AUTO-RTO: 0 /100 WBC (ref 0–0.2)
OXYHGB MFR BLDV: 93.4 % (ref 94–99)
PCO2 BLDA: 56.2 MM HG (ref 35–45)
PH BLDA: 7.37 PH UNITS (ref 7.35–7.45)
PLATELET # BLD AUTO: 123 10*3/MM3 (ref 140–450)
PMV BLD AUTO: 11.6 FL (ref 6–12)
PO2 BLD: 235 MM[HG] (ref 0–500)
PO2 BLDA: 84.5 MM HG (ref 80–100)
POTASSIUM SERPL-SCNC: 3.6 MMOL/L (ref 3.5–5.2)
PROCALCITONIN SERPL-MCNC: 0.08 NG/ML (ref 0–0.25)
RBC # BLD AUTO: 4.27 10*6/MM3 (ref 4.14–5.8)
SAO2 % BLDCOA: 95.8 % (ref 95–99)
SODIUM SERPL-SCNC: 136 MMOL/L (ref 136–145)
WBC NRBC COR # BLD: 5.12 10*3/MM3 (ref 3.4–10.8)

## 2023-02-20 PROCEDURE — G0378 HOSPITAL OBSERVATION PER HR: HCPCS

## 2023-02-20 PROCEDURE — 94640 AIRWAY INHALATION TREATMENT: CPT

## 2023-02-20 PROCEDURE — 94618 PULMONARY STRESS TESTING: CPT

## 2023-02-20 PROCEDURE — 80048 BASIC METABOLIC PNL TOTAL CA: CPT | Performed by: FAMILY MEDICINE

## 2023-02-20 PROCEDURE — 25010000002 HEPARIN (PORCINE) PER 1000 UNITS: Performed by: FAMILY MEDICINE

## 2023-02-20 PROCEDURE — 36600 WITHDRAWAL OF ARTERIAL BLOOD: CPT | Performed by: FAMILY MEDICINE

## 2023-02-20 PROCEDURE — 99239 HOSP IP/OBS DSCHRG MGMT >30: CPT | Performed by: INTERNAL MEDICINE

## 2023-02-20 PROCEDURE — 63710000001 PREDNISONE PER 1 MG: Performed by: INTERNAL MEDICINE

## 2023-02-20 PROCEDURE — 94799 UNLISTED PULMONARY SVC/PX: CPT

## 2023-02-20 PROCEDURE — 85025 COMPLETE CBC W/AUTO DIFF WBC: CPT | Performed by: FAMILY MEDICINE

## 2023-02-20 PROCEDURE — 94660 CPAP INITIATION&MGMT: CPT

## 2023-02-20 PROCEDURE — 99406 BEHAV CHNG SMOKING 3-10 MIN: CPT | Performed by: INTERNAL MEDICINE

## 2023-02-20 PROCEDURE — 82805 BLOOD GASES W/O2 SATURATION: CPT | Performed by: FAMILY MEDICINE

## 2023-02-20 PROCEDURE — 83050 HGB METHEMOGLOBIN QUAN: CPT | Performed by: FAMILY MEDICINE

## 2023-02-20 PROCEDURE — 84145 PROCALCITONIN (PCT): CPT | Performed by: INTERNAL MEDICINE

## 2023-02-20 PROCEDURE — 82375 ASSAY CARBOXYHB QUANT: CPT | Performed by: FAMILY MEDICINE

## 2023-02-20 PROCEDURE — 96372 THER/PROPH/DIAG INJ SC/IM: CPT

## 2023-02-20 RX ORDER — LIDOCAINE HYDROCHLORIDE 20 MG/ML
JELLY TOPICAL AS NEEDED
Status: DISCONTINUED | OUTPATIENT
Start: 2023-02-20 | End: 2023-02-20 | Stop reason: HOSPADM

## 2023-02-20 RX ORDER — FUROSEMIDE 40 MG/1
40 TABLET ORAL 3 TIMES DAILY
Status: DISCONTINUED | OUTPATIENT
Start: 2023-02-20 | End: 2023-02-20 | Stop reason: HOSPADM

## 2023-02-20 RX ORDER — SPIRONOLACTONE 25 MG/1
50 TABLET ORAL 2 TIMES DAILY
Status: DISCONTINUED | OUTPATIENT
Start: 2023-02-20 | End: 2023-02-20 | Stop reason: HOSPADM

## 2023-02-20 RX ORDER — METOPROLOL SUCCINATE 50 MG/1
50 TABLET, EXTENDED RELEASE ORAL DAILY
Status: DISCONTINUED | OUTPATIENT
Start: 2023-02-20 | End: 2023-02-20 | Stop reason: HOSPADM

## 2023-02-20 RX ORDER — DOXEPIN HYDROCHLORIDE 50 MG/1
50 CAPSULE ORAL NIGHTLY
Status: DISCONTINUED | OUTPATIENT
Start: 2023-02-20 | End: 2023-02-20 | Stop reason: HOSPADM

## 2023-02-20 RX ORDER — ESCITALOPRAM OXALATE 10 MG/1
20 TABLET ORAL EVERY MORNING
Status: DISCONTINUED | OUTPATIENT
Start: 2023-02-20 | End: 2023-02-20 | Stop reason: HOSPADM

## 2023-02-20 RX ORDER — ALBUTEROL SULFATE 90 UG/1
2 AEROSOL, METERED RESPIRATORY (INHALATION) EVERY 4 HOURS PRN
Qty: 6.7 G | Refills: 0 | Status: SHIPPED | OUTPATIENT
Start: 2023-02-20

## 2023-02-20 RX ORDER — DOXYCYCLINE 100 MG/1
100 CAPSULE ORAL EVERY 12 HOURS SCHEDULED
Status: DISCONTINUED | OUTPATIENT
Start: 2023-02-20 | End: 2023-02-20 | Stop reason: HOSPADM

## 2023-02-20 RX ORDER — PREDNISONE 20 MG/1
40 TABLET ORAL
Status: DISCONTINUED | OUTPATIENT
Start: 2023-02-20 | End: 2023-02-20 | Stop reason: HOSPADM

## 2023-02-20 RX ORDER — DOXYCYCLINE 100 MG/1
100 CAPSULE ORAL EVERY 12 HOURS SCHEDULED
Qty: 9 CAPSULE | Refills: 0 | Status: SHIPPED | OUTPATIENT
Start: 2023-02-20 | End: 2023-02-25

## 2023-02-20 RX ORDER — BUPRENORPHINE HYDROCHLORIDE AND NALOXONE HYDROCHLORIDE DIHYDRATE 8; 2 MG/1; MG/1
1 TABLET SUBLINGUAL DAILY
Status: DISCONTINUED | OUTPATIENT
Start: 2023-02-20 | End: 2023-02-20 | Stop reason: HOSPADM

## 2023-02-20 RX ORDER — QUETIAPINE FUMARATE 200 MG/1
400 TABLET, FILM COATED ORAL NIGHTLY
Status: DISCONTINUED | OUTPATIENT
Start: 2023-02-20 | End: 2023-02-20 | Stop reason: HOSPADM

## 2023-02-20 RX ORDER — PREDNISONE 20 MG/1
40 TABLET ORAL
Qty: 8 TABLET | Refills: 0 | Status: SHIPPED | OUTPATIENT
Start: 2023-02-21 | End: 2023-02-25

## 2023-02-20 RX ORDER — FLUTICASONE PROPIONATE AND SALMETEROL XINAFOATE 230; 21 UG/1; UG/1
2 AEROSOL, METERED RESPIRATORY (INHALATION)
Qty: 12 G | Refills: 0 | Status: SHIPPED | OUTPATIENT
Start: 2023-02-20

## 2023-02-20 RX ORDER — BUPROPION HYDROCHLORIDE 150 MG/1
TABLET, EXTENDED RELEASE ORAL
Qty: 30 TABLET | Refills: 0 | Status: SHIPPED | OUTPATIENT
Start: 2023-02-20 | End: 2023-03-22

## 2023-02-20 RX ORDER — NICOTINE 21 MG/24HR
1 PATCH, TRANSDERMAL 24 HOURS TRANSDERMAL
Status: DISCONTINUED | OUTPATIENT
Start: 2023-02-20 | End: 2023-02-20 | Stop reason: HOSPADM

## 2023-02-20 RX ORDER — NICOTINE 21 MG/24HR
1 PATCH, TRANSDERMAL 24 HOURS TRANSDERMAL
Qty: 30 PATCH | Refills: 0 | Status: SHIPPED | OUTPATIENT
Start: 2023-02-21 | End: 2023-03-23

## 2023-02-20 RX ADMIN — IPRATROPIUM BROMIDE AND ALBUTEROL SULFATE 3 ML: .5; 2.5 SOLUTION RESPIRATORY (INHALATION) at 06:55

## 2023-02-20 RX ADMIN — Medication 10 ML: at 00:45

## 2023-02-20 RX ADMIN — DOXYCYCLINE 100 MG: 100 CAPSULE ORAL at 10:52

## 2023-02-20 RX ADMIN — HEPARIN SODIUM 5000 UNITS: 5000 INJECTION INTRAVENOUS; SUBCUTANEOUS at 00:45

## 2023-02-20 RX ADMIN — SPIRONOLACTONE 50 MG: 25 TABLET ORAL at 10:52

## 2023-02-20 RX ADMIN — FUROSEMIDE 40 MG: 40 TABLET ORAL at 10:52

## 2023-02-20 RX ADMIN — PREDNISONE 40 MG: 20 TABLET ORAL at 10:52

## 2023-02-20 RX ADMIN — Medication 10 ML: at 08:22

## 2023-02-20 RX ADMIN — ESCITALOPRAM OXALATE 20 MG: 10 TABLET ORAL at 10:52

## 2023-02-20 RX ADMIN — METOPROLOL SUCCINATE 50 MG: 50 TABLET, EXTENDED RELEASE ORAL at 10:52

## 2023-02-20 RX ADMIN — BUPRENORPHINE AND NALOXONE 1 TABLET: 8; 2 TABLET SUBLINGUAL at 13:26

## 2023-02-20 RX ADMIN — FUROSEMIDE 40 MG: 40 TABLET ORAL at 15:41

## 2023-02-20 RX ADMIN — IPRATROPIUM BROMIDE AND ALBUTEROL SULFATE 3 ML: .5; 2.5 SOLUTION RESPIRATORY (INHALATION) at 00:18

## 2023-02-20 RX ADMIN — NICOTINE 1 PATCH: 21 PATCH, EXTENDED RELEASE TRANSDERMAL at 00:46

## 2023-02-20 RX ADMIN — HEPARIN SODIUM 5000 UNITS: 5000 INJECTION INTRAVENOUS; SUBCUTANEOUS at 08:21

## 2023-02-20 RX ADMIN — IPRATROPIUM BROMIDE AND ALBUTEROL SULFATE 3 ML: .5; 2.5 SOLUTION RESPIRATORY (INHALATION) at 12:31

## 2023-02-20 NOTE — SIGNIFICANT NOTE
02/20/23 0947   Plan   Final Discharge Disposition Code 01 - home or self-care   [facility]. [facility] bed available today at 5:30. Report can be called to Li at [phone]. Please provide DC packet to pt. Bettery uses [facility], MALLIKA updated in [employer].   ** will provide cab voucher.

## 2023-02-20 NOTE — SIGNIFICANT NOTE
02/20/23 1115   Coping/Psychosocial   Observed Emotional State calm;cooperative;happy   Verbalized Emotional State hopefulness   Trust Relationship/Rapport empathic listening provided   Involvement in Care interacting with patient   Additional Documentation Spiritual Care (Group)   Spiritual Care   Use of Spiritual Resources non-Faith use of spiritual care   Spiritual Care Source  initiative   Spiritual Care Follow-Up follow-up, none required as presently assessed   Response to Spiritual Care engaged in conversation;receptive of support   Spiritual Care Interventions supportive conversation provided   Spiritual Care Visit Type initial   Receptivity to Spiritual Care visit welcomed

## 2023-02-20 NOTE — PLAN OF CARE
Goal Outcome Evaluation:              Outcome Evaluation: Pt arrived to the floor as an admission from the ER. Pt is non compliant with wearing his bipap or his oxygen. VSS.  Will continue to monitor

## 2023-02-20 NOTE — SIGNIFICANT NOTE
02/20/23 1800   Plan   Plan Comments MALLIKA spoke with nurse, Joan, who stated that patient failed walk test but wanted to leave and wasn't going to be compliant with O2. Nurse consulted with provider who said that due to patient's noncompliance, he is able to discharge without O2 at his own will. MALLIKA informed that if patient changed his mind, to contact MALLIKA and request for O2 will be put in.   Final Discharge Disposition Code 01 - home or self-care

## 2023-02-20 NOTE — SIGNIFICANT NOTE
02/19/23 2022   Plan   Plan Comments SW called [facility] to verify that pt does have a bed tomorrow morning. [facility] reports that he needed a higher level of care than they can provide so they referred him to [facility]. [facility] has a bed for him tomorrow morning when the admissions team is back and they are able to take him, but he does have a bed. provider updated. email was sent to ed social work for follow up tomorrow.   Final Discharge Disposition Code 30 - still a patient

## 2023-02-20 NOTE — NURSING NOTE
Exercise Oximetry    Patient Name:Jabari Dixon   MRN: [id number]   Date: 02/20/23             ROOM AIR BASELINE   SpO2% 92   Heart Rate 70   Blood Pressure 131/82     EXERCISE ON ROOM AIR SpO2% EXERCISE ON O2 @ 2 LPM SpO2%   1 MINUTE 92 1 MINUTE    2 MINUTES 93 2 MINUTES    3 MINUTES 85 3 MINUTES    4 MINUTES  4 MINUTES 93 2L   5 MINUTES  5 MINUTES 95   6 MINUTES  6 MINUTES 96              Distance Walked   Distance Walked   Dyspnea (Rigoberto Scale)  Dyspnea (Rigoberto Scale)   Fatigue (Rigoberto Scale)   Fatigue (Rigoberto Scale)   SpO2% Post Exercise   SpO2% Post Exercise   HR Post Exercise   HR Post Exercise   Time to Recovery   Time to Recovery     Comments:

## 2023-02-20 NOTE — SIGNIFICANT NOTE
02/19/23 1916   Plan   Plan Comments mai met with pt at bedside. pt states that [facility] for him but to call back in the morning to get his bed squared away with the admission team. MAI updated provider. Chema Osuna with [facility] in [place], [place] is the  for this. [phone]   Final Discharge Disposition Code 30 - still a patient

## 2023-02-20 NOTE — DISCHARGE SUMMARY
[facility]         HOSPITALIST  DISCHARGE SUMMARY    Patient Name: Jabari Dixon    : 1970    MRN: [id number]    Date of Admission: 2023  Date of Discharge:  23  Primary Care Physician: Ernesto Cerda MD    Consults     Date and Time Order Name Status Description    2023  9:46 PM Hospitalist (on-call MD unless specified)      2023  5:05 PM Hospitalist (on-call MD unless specified)      2023  7:39 AM Inpatient Pulmonology Consult Completed           Final Diagnosis:  Acute toxic hypercapnic respiratory failure  Acute on chronic COPD exacerbation  Acute metabolic encephalopathy in setting of respiratory failure  Alcoholic cirrhosis with history of ascites  Hypertension  History of substance abuse on chronic Suboxone  Chronic nicotine dependence/smoking half to 1 pack a day  Morbid obesity BMI 42    Hospital Course     History of Present Illness  Patient is a 53-year-old male with past medical history of cirrhosis of the liver, alcohol abuse, chronic hypoxic hypercapnic respiratory failure hypertension and COPD.  Patient presents to the emergency department sent in because of lethargy.  The patient had been living at a sober living facility.  Unfortunately he was discharged from there because of going out to smoke.  They did however send him into the emergency department because they found him unconscious and difficult to arouse.  Patient was brought into the emergency department.  The patient's ABG found that he was hypercapnic and had a respiratory acidosis.  BiPAP was placed and the patient's condition improved.  He became much more awake and alert.  He is now on nasal cannula without difficulties.  Since the patient was discharged from the sober living facility the emergency room along with  work to find him a place to go upon discharge.  They found a somewhere called [facility].  The patient will be admitted there tomorrow if medically  stable.    Hospital Course: After initial BiPAP did not require further.  Has an outpatient sleep study for further evaluation.  Did not want to wear it overnight after initial improvement in did well on follow-up ABG.  His mentation was back to normal.  He was counseled on smoking cessation and willing to quit, half pack per day currently and is interested in nicotine patches and Wellbutrin.  These were prescribed for discharge.  Does not appear that he has consistent inhalers available and Advair, which she apparently has used before, and an albuterol rescue inhaler initiated.  Given a 4-day remaining course of prednisone and couple of days of doxycycline as well for COPD exacerbation. Patient was weaned to room air at rest.      Patient discharging to sober living facility, follow-up with PCP and continue outpatient COPD monitoring and BMA evaluation.    Patient discharged in stable condition with close PCP  follow up.   Return precautions and follow up discussed and patient voiced agreement and understanding of treatment plan.     DISCHARGE Follow Up Recommendations for labs and diagnostics:   Follow-up outpatient sleep study  Follow-up smoking cessation while  Follow-up COPD  Follow-up cirrhosis and appropriate monitoring      CODE STATUS:  Code Status and Medical Interventions:   Ordered at: 02/19/23 1500     Level Of Support Discussed With:    Patient     Code Status (Patient has no pulse and is not breathing):    CPR (Attempt to Resuscitate)     Medical Interventions (Patient has pulse or is breathing):    Full Support           Day of Discharge     Vital Signs:  Temp:  [97.8 °F (36.6 °C)-98.2 °F (36.8 °C)] 98.2 °F (36.8 °C)  Heart Rate:  [68-89] 69  Resp:  [12-20] 18  BP: ()/(50-91) 131/82  Flow (L/min):  [2-4] 4    Physical Exam  Gen: awake, resting in bed, conversant  HENT: NCAT, mmm  Resp: Mild upper airway expiratory wheezing, good aeration overall, no acute respiratory distress on room air   CV:  RRR, no LE pitting edema  GI: Abdomen soft, NT, ND, no guarding, +BS  Psych: appropriate mood and affect, aox3  Skin: warm, dry      Discharge Details        Discharge Medications      New Medications      Instructions Start Date   Advair -21 MCG/ACT inhaler  Generic drug: fluticasone-salmeterol   2 puffs, Inhalation, 2 Times Daily - RT, Please provide one month supply.  Replace with formulary approved equivalent as needed.      albuterol sulfate  (90 Base) MCG/ACT inhaler  Commonly known as: PROVENTIL HFA;VENTOLIN HFA;PROAIR HFA   2 puffs, Inhalation, Every 4 Hours PRN, Please provide one month supply.  Replace with formulary approved equivalent as needed.      buPROPion  MG 12 hr tablet  Commonly known as: Wellbutrin SR   Take 1 tablet by mouth Daily for 3 days, THEN 1 tablet Daily for 27 days.   Start Date: February 20, 2023     doxycycline 100 MG capsule  Commonly known as: MONODOX   100 mg, Oral, Every 12 Hours Scheduled      nicotine 21 MG/24HR patch  Commonly known as: NICODERM CQ   1 patch, Transdermal, Every 24 Hours Scheduled   Start Date: February 21, 2023     predniSONE 20 MG tablet  Commonly known as: DELTASONE   40 mg, Oral, Daily With Breakfast   Start Date: February 21, 2023        Continue These Medications      Instructions Start Date   buprenorphine-naloxone 8-2 MG per SL tablet  Commonly known as: SUBOXONE   1 tablet, Sublingual, Daily      doxepin 50 MG capsule  Commonly known as: SINEquan   50 mg, Oral, Every Night at Bedtime      escitalopram 20 MG tablet  Commonly known as: LEXAPRO   20 mg, Oral, Every Morning      furosemide 40 MG tablet  Commonly known as: LASIX   40 mg, Oral, 3 Times Daily      gabapentin 600 MG tablet  Commonly known as: NEURONTIN   1 tablet, Oral, 3 Times Daily      metoprolol succinate XL 50 MG 24 hr tablet  Commonly known as: TOPROL-XL   1 tablet, Oral, Daily      potassium chloride 20 MEQ CR tablet  Commonly known as: K-DUR,KLOR-CON   1 tablet,  Oral, 2 Times Daily      QUEtiapine 400 MG tablet  Commonly known as: SEROquel   400 mg, Oral, Every Night at Bedtime      spironolactone 50 MG tablet  Commonly known as: ALDACTONE   1 tablet, Oral, 2 Times Daily               Discharge Disposition:  Rehab Facility or Unit (DC - External)    Diet: patient counseled on dietary changes made during hospital and plans to  advance as tolerated     Discharge Activity: advance as tolerated    Future Appointments   Date Time Provider Department Center   5/3/2023 11:00 AM Maverick Ryan MD [facility] SLEEP [facility]       Additional Instructions for the Follow-ups that You Need to Schedule     Discharge Follow-up with PCP   As directed       Currently Documented PCP:    Ernesto Cerda MD    PCP Phone Number:    [phone]     Follow Up Details: 3-5 days - PCP copd exacerbation f/u               Pertinent  and/or Most Recent Results       LAB RESULTS:      Lab 02/20/23  0550 02/19/23  1556 02/19/23  1500 02/17/23  0404 02/16/23  2225 02/16/23  1816 02/16/23  1544 02/16/23  1301   WBC 5.12  --  7.58 7.85 8.15  --   --  6.78   HEMOGLOBIN 13.1  --  13.2 14.2 14.2  --   --  14.4   HEMATOCRIT 40.5  --  40.5 43.8 43.9  --   --  45.6   PLATELETS 123*  --  129* 165 163  --   --  165   NEUTROS ABS 2.60  --  4.57 4.81 4.89  --   --  3.45   IMMATURE GRANS (ABS) 0.03  --  0.05 0.06* 0.07*  --   --  0.06*   LYMPHS ABS 1.34  --  1.41 1.52 1.61  --   --  1.79   MONOS ABS 0.75  --  1.17* 1.06* 1.14*  --   --  1.08*   EOS ABS 0.37  --  0.34 0.35 0.38  --   --  0.36   MCV 94.8  --  96.2 96.9 96.1  --   --  98.1*   PROCALCITONIN 0.08  --   --   --   --   --   --   --    LACTATE, ARTERIAL  --  0.88  --   --   --  1.02 0.90  --    PROTIME  --   --   --   --   --   --   --  13.4   APTT  --   --   --   --   --   --   --  34.5*         Lab 02/20/23  0550 02/19/23  1556 02/19/23  1500 02/17/23  0404 02/16/23  2225 02/16/23  1816 02/16/23  1301   SODIUM 136  --  135* 137 136  --  135*   SODIUM,  ARTERIAL  --  138.0  --   --   --  137.6  --    POTASSIUM 3.6  --  3.7 4.3 4.5  --  4.4   CHLORIDE 97*  --  95* 101 100  --  100   CO2 30.7*  --  31.2* 25.7 26.0  --  26.8   ANION GAP 8.3  --  8.8 10.3 10.0  --  8.2   BUN 22*  --  27* 17 16  --  14   CREATININE 1.04  --  1.10 1.10 1.17  --  1.16   EGFR 84.8  --  79.3 79.3 73.6  --  74.4   GLUCOSE 121*  --  115* 118* 127*  --  108*   GLUCOSE, ARTERIAL  --  108*  --   --   --  118*  --    CALCIUM 9.1  --  8.9 9.0 9.1  --  9.1   IONIZED CALCIUM  --  1.15  --   --   --  1.16  --          Lab 02/19/23  1500 02/16/23  1301   TOTAL PROTEIN 8.2 8.8*   ALBUMIN 4.0 4.1   GLOBULIN 4.2 4.7   ALT (SGPT) 17 21   AST (SGOT) 22 21   BILIRUBIN 0.6 0.4   ALK PHOS 100 117         Lab 02/19/23  1735 02/19/23  1500 02/16/23  1542 02/16/23  1301   PROBNP  --  381.0  --  <36.0   HSTROP T 7 8 7 8   PROTIME  --   --   --  13.4   INR  --   --   --  1.01                 Lab 02/20/23  0011 02/19/23  1556 02/17/23  0315   PH, ARTERIAL 7.368 7.320* 7.295*   PCO2, ARTERIAL 56.2* 60.1* 57.6*   PO2 ART 84.5 64.1* 85.1   O2 SATURATION ART 95.8 91.3* 95.5   FIO2 36 28 28   HCO3 ART 31.6* 30.3* 27.4*   BASE EXCESS ART 4.6* 2.6* -0.4   CARBOXYHEMOGLOBIN 2.3* 4.5* 1.6*     Brief Urine Lab Results  (Last result in the past 365 days)      Color   Clarity   Blood   Leuk Est   Nitrite   Protein   CREAT   Urine HCG        01/16/23 1239 Yellow   Clear   Negative   Trace   Negative   Negative               Microbiology Results (last 10 days)     Procedure Component Value - Date/Time    COVID PRE-OP / PRE-PROCEDURE SCREENING ORDER (NO ISOLATION) - Swab, Nasopharynx [[id number]]  (Normal) Collected: 02/17/23 0807    Lab Status: Final result Specimen: Swab from Nasopharynx Updated: 02/17/23 1445    Narrative:      The following orders were created for panel order COVID PRE-OP / PRE-PROCEDURE SCREENING ORDER (NO ISOLATION) - Swab, Nasopharynx.  Procedure                               Abnormality         Status                      ---------                               -----------         ------                     COVID-19,APTIMA PANTHER(...[[id number]]  Normal              Final result                 Please view results for these tests on the individual orders.    COVID-19,APTIMA PANTHER(PR[facility]),[facility], NP/OP SWAB IN UTM/VTM/SALINE TRANSPORT MEDIA,24 HR TAT - Swab, Nasopharynx [[id number]]  (Normal) Collected: 02/17/23 0807    Lab Status: Final result Specimen: Swab from Nasopharynx Updated: 02/17/23 1445     COVID19 Not Detected    Narrative:      Fact sheet for providers: https://www.fda.gov/media/534445/download     Fact sheet for patients: https://www.fda.gov/media/370123/download    Test performed by RT PCR.    Mycoplasma Pneumoniae Antibody, IgM - Blood, [[id number]]  (Normal) Collected: 02/16/23 1301    Lab Status: Final result Specimen: Blood Updated: 02/17/23 0834     Mycoplasma pneumo IgM Negative          RADIOLOGY:    CT Head Without Contrast    Result Date: 2/17/2023  Impression:   1. Mild small vessel ischemic changes in the white matter 2. No CT evidence of acute infarction, mass or intracranial hemorrhage     Ronnie Cerda M.D.       Electronically Signed and Approved By: Ronnie Cerda M.D. on 2/17/2023 at 15:21             CT Head Without Contrast    Result Date: 2/16/2023  Impression:   1. Mild small vessel ischemic changes in the white matter 2. No specific CT evidence of acute infarction or intracranial hemorrhage.  If there is persistent clinical suspicion of acute neurologic abnormality, consider brain MRI to further evaluate.     Ronnie Cerda M.D.       Electronically Signed and Approved By: Ronnie Cerda M.D. on 2/16/2023 at 18:53             CT Chest Without Contrast Diagnostic    Result Date: 2/17/2023  Impression:  Cirrhosis, splenomegaly with evidence of portal hypertension.  Prominent lymph nodes in the mychal hepatis may be secondary to viral hepatitis.   MARIE KLEIN MD       Electronically  Signed and Approved By: MARIE KLEIN MD on 2/17/2023 at 16:20             XR Chest 1 View    Result Date: 2/19/2023  Impression:  No active disease is seen.       TANVIR MILLER MD       Electronically Signed and Approved By: TANVIR MILLER MD on 2/19/2023 at 14:36             XR Chest 1 View    Result Date: 2/16/2023  Impression:   1. Pulmonary vascular congestion and prominence of the interstitial markings which may represent underlying pulmonary edema /infection in the correct clinical setting.       DEEPIKA ZIMMERMAN MD       Electronically Signed and Approved By: DEEPIKA ZIMMERMAN MD on 2/16/2023 at 15:29                   Labs Pending at Discharge:        Time spent on Discharge including face to face service: 45 minutes        Smoking cessation counseling provided to patient for 4.5 minutes.  Patient has ~30 pack year smoking history.  Discussed the risks of continued smoking including increased risk of cancer, lung disease, blood clots and worsening cardiovascular health.  Discussed options to help with smoking cessation including nicotine patches, lozenges, and pharmacotherapy.  Instructed patient on 1800-quit-now phone number to obtain additional resources and free patches/smoking cessation aids.   Patient is willing to quit smoking.  Stop date set for day of admission to the hospital - he wants to continue cessation from his day of admission.  He wants NRT with patches and to try wellbutrin.    Start taking the wellbutrin medication to help with smoking cessation today.  Plan to stop smoking in 7 days (if he changes his mind) to give the best chance for success with quitting. It may cause some nausea or dizziness symptoms but these usually resolve after a few days.  If you are tolerating it well increase to taking the wellbutrin 150mg twice a day. Please follow up with your primary care doctor to continue monitoring your response to treatment.       Do NOT use the nicotine patches while you are still  smoking as this can lead to too high of a dose of nicotine.  Start using the nicotine patches on the day you decide to quit.

## 2023-02-20 NOTE — H&P
[facility]   HISTORY AND PHYSICAL    Patient Name: Jabari Dixon  : 1970  MRN: [id number]  Primary Care Physician:  Ernesto Cerda MD  Date of admission: 2023    Subjective   Subjective     Chief Complaint: Altered mental status    History of Present Illness  Patient is a 53-year-old male with past medical history of cirrhosis of the liver, alcohol abuse, chronic hypoxic hypercapnic respiratory failure hypertension and COPD.  Patient presents to the emergency department sent in because of lethargy.  The patient had been living at a sober living facility.  Unfortunately he was discharged from there because of going out to smoke.  They did however send him into the emergency department because they found him unconscious and difficult to arouse.  Patient was brought into the emergency department.  The patient's ABG found that he was hypercapnic and had a respiratory acidosis.  BiPAP was placed and the patient's condition improved.  He became much more awake and alert.  He is now on nasal cannula without difficulties.  Since the patient was discharged from the sober living facility the emergency room along with  work to find him a place to go upon discharge.  They found a somewhere called [facility].  The patient will be admitted there tomorrow if medically stable.  Review of Systems   Constitutional: Positive for fatigue.   Respiratory: Positive for shortness of breath and wheezing.    Psychiatric/Behavioral: Positive for confusion.   All other systems reviewed and are negative.       Personal History     Past Medical History:   Diagnosis Date   • Cirrhosis of liver (HCC)    • COPD (chronic obstructive pulmonary disease) (HCC)    • Hypertension        Past Surgical History:   Procedure Laterality Date   • PARACENTESIS      abd       Family History: family history is not on file. Otherwise pertinent FHx was reviewed and not pertinent to current issue.    Social History:  reports that he has  been smoking cigarettes. He has been smoking an average of 1 pack per day. He has never used smokeless tobacco. He reports that he does not currently use alcohol. He reports that he does not currently use drugs after having used the following drugs: Methamphetamines and IV.    Home Medications:  QUEtiapine, amLODIPine, buprenorphine-naloxone, doxepin, escitalopram, furosemide, gabapentin, metoprolol succinate XL, naproxen, potassium chloride, and spironolactone    Allergies:  Allergies   Allergen Reactions   • Penicillins Hives       Objective    Objective     Vitals:   Temp:  [98.2 °F (36.8 °C)] 98.2 °F (36.8 °C)  Heart Rate:  [71-89] 80  Resp:  [12-20] 14  BP: (111-154)/(54-91) 131/84  Flow (L/min):  [2-3] 3    Physical Exam  Vitals reviewed.   Constitutional:       Appearance: Normal appearance.   HENT:      Head: Normocephalic and atraumatic.      Nose: Nose normal.      Mouth/Throat:      Mouth: Mucous membranes are moist.   Eyes:      Extraocular Movements: Extraocular movements intact.      Pupils: Pupils are equal, round, and reactive to light.   Cardiovascular:      Rate and Rhythm: Normal rate and regular rhythm.      Pulses: Normal pulses.      Heart sounds: Normal heart sounds.   Pulmonary:      Effort: Pulmonary effort is normal.      Breath sounds: Wheezing present.   Abdominal:      General: Bowel sounds are normal.      Palpations: Abdomen is soft.   Musculoskeletal:         General: Normal range of motion.      Cervical back: Normal range of motion.   Skin:     General: Skin is warm and dry.   Neurological:      General: No focal deficit present.      Mental Status: He is alert and oriented to person, place, and time.         Result Review    Result Review:  I have personally reviewed the results from the time of this admission to 2/19/2023 22:21 EST and agree with these findings:  [x]  Laboratory list / accordion  []  Microbiology  [x]  Radiology  []  EKG/Telemetry   []  Cardiology/Vascular   []   Pathology  []  Old records  []  Other:  Most notable findings include: Respiratory acidosis with hypercapnia and hypoxia      Assessment & Plan   Assessment / Plan     Brief Patient Summary:  Jabari Dixon is a 53 y.o. male who was brought into the emergency department because of altered mental status.  He was found to be in hypercapnic respiratory failure.  The patient had just signed himself AMA from this institution as well as his sober living facility.  Social work has found him a place to go if/when discharged tomorrow.    Active Hospital Problems:  1.  Acute on chronic toxic hypercapnic respiratory failure  2.  Altered mental status  3.  Acute exacerbation of COPD  4.  Liver cirrhosis with a history of alcohol abuse  5.  Nicotine addiction/abuse  Plan:   Patient will be admitted at least overnight for further evaluation and treatment.  Patient will be placed on BiPAP.  Patient needs an outpatient sleep study in order to be approved for a home CPAP or APAP otherwise the patient will require continued multiple ER visits and probable admissions.  He was encouraged to stop smoking.  What    DVT prophylaxis:  No DVT prophylaxis order currently exists.    CODE STATUS:    Level Of Support Discussed With: Patient  Code Status (Patient has no pulse and is not breathing): CPR (Attempt to Resuscitate)  Medical Interventions (Patient has pulse or is breathing): Full Support    Admission Status:  I believe this patient meets obs status.    Crispin Castellon, DO

## 2023-02-22 LAB — QT INTERVAL: 371 MS

## 2023-02-26 LAB — QT INTERVAL: 388 MS

## 2023-03-24 ENCOUNTER — HOSPITAL ENCOUNTER (EMERGENCY)
Facility: HOSPITAL | Age: 53
Discharge: HOME | End: 2023-03-24
Attending: EMERGENCY MEDICINE | Admitting: EMERGENCY MEDICINE
Payer: MEDICARE

## 2023-03-24 ENCOUNTER — APPOINTMENT (OUTPATIENT)
Dept: CT IMAGING | Facility: HOSPITAL | Age: 53
End: 2023-03-24
Payer: MEDICARE

## 2023-03-24 VITALS
HEART RATE: 54 BPM | WEIGHT: 257.94 LBS | BODY MASS INDEX: 38.2 KG/M2 | DIASTOLIC BLOOD PRESSURE: 65 MMHG | RESPIRATION RATE: 20 BRPM | OXYGEN SATURATION: 90 % | TEMPERATURE: 97.5 F | HEIGHT: 69 IN | SYSTOLIC BLOOD PRESSURE: 91 MMHG

## 2023-03-24 DIAGNOSIS — G25.3 MYOCLONUS: Primary | ICD-10-CM

## 2023-03-24 LAB
ALBUMIN SERPL-MCNC: 3.8 G/DL (ref 3.5–5.2)
ALBUMIN/GLOB SERPL: 0.9 G/DL
ALP SERPL-CCNC: 94 U/L (ref 39–117)
ALT SERPL W P-5'-P-CCNC: 11 U/L (ref 1–41)
AMMONIA BLD-SCNC: 24 UMOL/L (ref 16–60)
AMPHET+METHAMPHET UR QL: NEGATIVE
ANION GAP SERPL CALCULATED.3IONS-SCNC: 8.8 MMOL/L (ref 5–15)
AST SERPL-CCNC: 21 U/L (ref 1–40)
BARBITURATES UR QL SCN: NEGATIVE
BASOPHILS # BLD AUTO: 0.04 10*3/MM3 (ref 0–0.2)
BASOPHILS NFR BLD AUTO: 0.6 % (ref 0–1.5)
BENZODIAZ UR QL SCN: NEGATIVE
BILIRUB SERPL-MCNC: 0.6 MG/DL (ref 0–1.2)
BILIRUB UR QL STRIP: NEGATIVE
BUN SERPL-MCNC: 24 MG/DL (ref 6–20)
BUN/CREAT SERPL: 17.8 (ref 7–25)
CALCIUM SPEC-SCNC: 8.7 MG/DL (ref 8.6–10.5)
CANNABINOIDS SERPL QL: NEGATIVE
CHLORIDE SERPL-SCNC: 99 MMOL/L (ref 98–107)
CLARITY UR: CLEAR
CO2 SERPL-SCNC: 27.2 MMOL/L (ref 22–29)
COCAINE UR QL: NEGATIVE
COLOR UR: YELLOW
CREAT SERPL-MCNC: 1.35 MG/DL (ref 0.76–1.27)
D-LACTATE SERPL-SCNC: 0.7 MMOL/L (ref 0.5–2)
DEPRECATED RDW RBC AUTO: 51.6 FL (ref 37–54)
EGFRCR SERPLBLD CKD-EPI 2021: 62 ML/MIN/1.73
EOSINOPHIL # BLD AUTO: 0.3 10*3/MM3 (ref 0–0.4)
EOSINOPHIL NFR BLD AUTO: 4.6 % (ref 0.3–6.2)
ERYTHROCYTE [DISTWIDTH] IN BLOOD BY AUTOMATED COUNT: 14.8 % (ref 12.3–15.4)
ETHANOL BLD-MCNC: <10 MG/DL (ref 0–10)
ETHANOL UR QL: <0.01 %
GLOBULIN UR ELPH-MCNC: 4.4 GM/DL
GLUCOSE SERPL-MCNC: 94 MG/DL (ref 65–99)
GLUCOSE UR STRIP-MCNC: NEGATIVE MG/DL
HCT VFR BLD AUTO: 40.1 % (ref 37.5–51)
HGB BLD-MCNC: 13.1 G/DL (ref 13–17.7)
HGB UR QL STRIP.AUTO: NEGATIVE
IMM GRANULOCYTES # BLD AUTO: 0.04 10*3/MM3 (ref 0–0.05)
IMM GRANULOCYTES NFR BLD AUTO: 0.6 % (ref 0–0.5)
KETONES UR QL STRIP: NEGATIVE
LEUKOCYTE ESTERASE UR QL STRIP.AUTO: NEGATIVE
LYMPHOCYTES # BLD AUTO: 1.48 10*3/MM3 (ref 0.7–3.1)
LYMPHOCYTES NFR BLD AUTO: 22.8 % (ref 19.6–45.3)
MAGNESIUM SERPL-MCNC: 2.2 MG/DL (ref 1.6–2.6)
MCH RBC QN AUTO: 31.3 PG (ref 26.6–33)
MCHC RBC AUTO-ENTMCNC: 32.7 G/DL (ref 31.5–35.7)
MCV RBC AUTO: 95.9 FL (ref 79–97)
METHADONE UR QL SCN: NEGATIVE
MONOCYTES # BLD AUTO: 1.12 10*3/MM3 (ref 0.1–0.9)
MONOCYTES NFR BLD AUTO: 17.2 % (ref 5–12)
NEUTROPHILS NFR BLD AUTO: 3.52 10*3/MM3 (ref 1.7–7)
NEUTROPHILS NFR BLD AUTO: 54.2 % (ref 42.7–76)
NITRITE UR QL STRIP: NEGATIVE
NRBC BLD AUTO-RTO: 0 /100 WBC (ref 0–0.2)
OPIATES UR QL: NEGATIVE
OXYCODONE UR QL SCN: NEGATIVE
PH UR STRIP.AUTO: 6.5 [PH] (ref 5–8)
PLATELET # BLD AUTO: 161 10*3/MM3 (ref 140–450)
PMV BLD AUTO: 11.3 FL (ref 6–12)
POTASSIUM SERPL-SCNC: 4.4 MMOL/L (ref 3.5–5.2)
PROT SERPL-MCNC: 8.2 G/DL (ref 6–8.5)
PROT UR QL STRIP: NEGATIVE
RBC # BLD AUTO: 4.18 10*6/MM3 (ref 4.14–5.8)
SODIUM SERPL-SCNC: 135 MMOL/L (ref 136–145)
SP GR UR STRIP: 1.01 (ref 1–1.03)
UROBILINOGEN UR QL STRIP: ABNORMAL
WBC NRBC COR # BLD: 6.5 10*3/MM3 (ref 3.4–10.8)

## 2023-03-24 PROCEDURE — 80307 DRUG TEST PRSMV CHEM ANLYZR: CPT | Performed by: PHYSICIAN ASSISTANT

## 2023-03-24 PROCEDURE — 83735 ASSAY OF MAGNESIUM: CPT | Performed by: PHYSICIAN ASSISTANT

## 2023-03-24 PROCEDURE — 93010 ELECTROCARDIOGRAM REPORT: CPT | Performed by: INTERNAL MEDICINE

## 2023-03-24 PROCEDURE — 81003 URINALYSIS AUTO W/O SCOPE: CPT | Performed by: PHYSICIAN ASSISTANT

## 2023-03-24 PROCEDURE — 93005 ELECTROCARDIOGRAM TRACING: CPT | Performed by: PHYSICIAN ASSISTANT

## 2023-03-24 PROCEDURE — 36415 COLL VENOUS BLD VENIPUNCTURE: CPT | Performed by: PHYSICIAN ASSISTANT

## 2023-03-24 PROCEDURE — 82140 ASSAY OF AMMONIA: CPT | Performed by: PHYSICIAN ASSISTANT

## 2023-03-24 PROCEDURE — 83605 ASSAY OF LACTIC ACID: CPT | Performed by: PHYSICIAN ASSISTANT

## 2023-03-24 PROCEDURE — 80053 COMPREHEN METABOLIC PANEL: CPT | Performed by: PHYSICIAN ASSISTANT

## 2023-03-24 PROCEDURE — 70450 CT HEAD/BRAIN W/O DYE: CPT

## 2023-03-24 PROCEDURE — 85025 COMPLETE CBC W/AUTO DIFF WBC: CPT | Performed by: PHYSICIAN ASSISTANT

## 2023-03-24 PROCEDURE — 99283 EMERGENCY DEPT VISIT LOW MDM: CPT

## 2023-03-24 PROCEDURE — 82077 ASSAY SPEC XCP UR&BREATH IA: CPT | Performed by: PHYSICIAN ASSISTANT

## 2023-03-24 NOTE — ED PROVIDER NOTES
Time: 7:45 PM EDT  Date of encounter:  3/24/2023  Independent Historian/Clinical History and Information was obtained by:   Patient  Chief Complaint   Patient presents with   • Seizures       History is limited by: N/A    History of Present Illness:  Patient is a 53 y.o. year old male who presents to the emergency department for evaluation of jerking in R arm. Patient reports he had an episode in which his R arm began jerking. He was awake and aware during the event. He states this has happened before. He notes this has been happening for the past 3 months since he has been recovering from alcohol abuse.      HPI    Patient Care Team  Primary Care Provider: Ernesto Cerda MD    Past Medical History:     Allergies   Allergen Reactions   • Penicillins Hives     Past Medical History:   Diagnosis Date   • Cirrhosis of liver (HCC)    • COPD (chronic obstructive pulmonary disease) (HCC)    • Hypertension      Past Surgical History:   Procedure Laterality Date   • PARACENTESIS      abd     No family history on file.    Home Medications:  Prior to Admission medications    Medication Sig Start Date End Date Taking? Authorizing Provider   albuterol sulfate  (90 Base) MCG/ACT inhaler Inhale 2 puffs Every 4 (Four) Hours As Needed for Wheezing. Please provide one month supply.  Replace with formulary approved equivalent as needed. 2/20/23   Odin Levine MD   buprenorphine-naloxone (SUBOXONE) 8-2 MG per SL tablet Place 1 tablet under the tongue Daily. 2/15/23   Mila Wooten MD   buPROPion SR (Wellbutrin SR) 150 MG 12 hr tablet Take 1 tablet by mouth Daily for 3 days, THEN 1 tablet Daily for 27 days. 2/20/23 3/22/23  Odin Levine MD   doxepin (SINEquan) 50 MG capsule Take 50 mg by mouth every night at bedtime. 2/6/23   Mila Wooten MD   escitalopram (LEXAPRO) 20 MG tablet Take 20 mg by mouth Every Morning. 2/6/23   Mila Wooten MD   fluticasone-salmeterol (Advair HFA) 230-21 MCG/ACT  "inhaler Inhale 2 puffs 2 (Two) Times a Day. Please provide one month supply.  Replace with formulary approved equivalent as needed. 2/20/23   Odin Levine MD   furosemide (LASIX) 40 MG tablet Take 40 mg by mouth 3 (Three) Times a Day.    Mila Wooten MD   gabapentin (NEURONTIN) 600 MG tablet Take 1 tablet by mouth 3 (Three) Times a Day. 2/15/23   Mila Wooten MD   metoprolol succinate XL (TOPROL-XL) 50 MG 24 hr tablet Take 1 tablet by mouth Daily. 1/20/23   Mila Wooten MD   nicotine (NICODERM CQ) 21 MG/24HR patch Place 1 patch on the skin as directed by provider Daily for 30 days. 2/21/23 3/23/23  Odin Levine MD   potassium chloride (K-DUR,KLOR-CON) 20 MEQ CR tablet Take 1 tablet by mouth 2 (Two) Times a Day. 2/15/23   Mila Wooten MD   QUEtiapine (SEROquel) 400 MG tablet Take 400 mg by mouth every night at bedtime. 2/6/23   Mila Wooten MD   spironolactone (ALDACTONE) 50 MG tablet Take 1 tablet by mouth 2 (Two) Times a Day. 2/15/23   Mila Wooten MD        Social History:   Social History     Tobacco Use   • Smoking status: Every Day     Packs/day: 1.00     Types: Cigarettes   • Smokeless tobacco: Never   Vaping Use   • Vaping Use: Never used   Substance Use Topics   • Alcohol use: Not Currently     Comment: recovering alcholic   • Drug use: Not Currently     Types: Methamphetamines, IV         Review of Systems:  Review of Systems   Constitutional: Negative for chills and fever.   HENT: Negative for sore throat.    Eyes: Negative for photophobia.   Respiratory: Negative for shortness of breath.    Cardiovascular: Negative for chest pain.   Gastrointestinal: Negative for abdominal pain, diarrhea, nausea and vomiting.   Genitourinary: Negative for dysuria.   Musculoskeletal: Negative for neck pain.   Skin: Negative for wound.   Neurological: Negative for headaches.        Positive for intermittent \"jerking\" in R arm   All other systems reviewed and are " "negative.       Physical Exam:  BP 91/65   Pulse 54   Temp 97.5 °F (36.4 °C) (Oral)   Resp 20   Ht 174 cm (68.5\")   Wt 117 kg (257 lb 15 oz)   SpO2 90%   BMI 38.65 kg/m²     Physical Exam  Vitals and nursing note reviewed.   Constitutional:       General: He is not in acute distress.  HENT:      Head: Normocephalic and atraumatic.   Eyes:      Extraocular Movements: Extraocular movements intact.   Cardiovascular:      Rate and Rhythm: Normal rate and regular rhythm.   Pulmonary:      Effort: Pulmonary effort is normal. No respiratory distress.      Breath sounds: Normal breath sounds.   Abdominal:      General: Abdomen is flat.      Palpations: Abdomen is soft.      Tenderness: There is no abdominal tenderness.   Musculoskeletal:         General: Normal range of motion.      Cervical back: Normal range of motion and neck supple.   Skin:     General: Skin is warm and dry.      Capillary Refill: Capillary refill takes less than 2 seconds.   Neurological:      Mental Status: He is alert and oriented to person, place, and time. Mental status is at baseline.                  Procedures:  Procedures      Medical Decision Making:      Comorbidities that affect care:    COPD, Hypertension, Smoking    External Notes reviewed:    Hospital Discharge Summary: 2/20/2023 patient was discharged after admission for hypercapnic respiratory failure, COPD exacerbation, metabolic encephalopathy, alcoholic cirrhosis, hypertension, substance abuse      The following orders were placed and all results were independently analyzed by me:  Orders Placed This Encounter   Procedures   • CT Head Without Contrast   • Comprehensive Metabolic Panel   • Magnesium   • Lactic Acid, Plasma   • Ethanol   • Urine Drug Screen - Urine, Clean Catch   • Urinalysis With Microscopic If Indicated (No Culture) - Urine, Clean Catch   • CBC Auto Differential   • Ammonia   • POC Glucose STAT   • ECG 12 Lead Other; seizure   • CBC & Differential "       Medications Given in the Emergency Department:  Medications - No data to display     ED Course:    The patient was initially evaluated in the triage area where orders were placed. The patient was later dispositioned by Omar Winchester DO.      The patient was advised to stay for completion of workup which includes but is not limited to communication of labs and radiological results, reassessment and plan. The patient was advised that leaving prior to disposition by a provider could result in critical findings that are not communicated to the patient.          Labs:    Lab Results (last 24 hours)     ** No results found for the last 24 hours. **           Imaging:    No Radiology Exams Resulted Within Past 24 Hours      Differential Diagnosis and Discussion:      Weakness: Based on the patient's history, signs, and symptoms, the diffential diagnosis includes but is not limited to meningitis, stroke, sepsis, subarachnoid hemorrhage, intracranial bleeding, encephalitis, acute uti, dehydration, MS, myasthenia gravis, Guillan [place], migraine variant, neuromuscular disorders vertigo, electrolyte imbalance, and metabolic disorders.    All labs were reviewed and interpreted by me.  EKG was interpreted by me.  CT scan radiology interpretation was reviewed by me.    MDM         Patient Care Considerations:          Consultants/Shared Management Plan:        Social Determinants of Health:    Patient is independent, reliable, and has access to care.       Disposition and Care Coordination:    Discharged: The patient is suitable and stable for discharge with no need for consideration of observation or admission.        Final diagnoses:   Myoclonus        ED Disposition     ED Disposition   Discharge    Condition   Stable    Comment   --             This medical record created using voice recognition software.    Documentation assistance provided by Brent Ponce acting as scribe for Omar Winchester DO. Information recorded by  the scribe was done at my direction and has been verified and validated by me.         Brent Ponce  03/24/23 2005       Omar Winchester DO  03/29/23 0106

## 2023-03-24 NOTE — ED NOTES
Pt states, he has spells of his head or arms jerking but they do not occur at the same time. He states all this started when he stopped drinking.

## 2023-03-25 LAB — QT INTERVAL: 398 MS

## 2023-05-03 ENCOUNTER — OFFICE VISIT (OUTPATIENT)
Dept: SLEEP MEDICINE | Facility: HOSPITAL | Age: 53
End: 2023-05-03
Payer: MEDICARE

## 2023-05-03 VITALS
OXYGEN SATURATION: 91 % | HEART RATE: 63 BPM | SYSTOLIC BLOOD PRESSURE: 98 MMHG | HEIGHT: 69 IN | WEIGHT: 252.8 LBS | BODY MASS INDEX: 37.44 KG/M2 | DIASTOLIC BLOOD PRESSURE: 61 MMHG

## 2023-05-03 DIAGNOSIS — F11.10 NARCOTIC ABUSE: ICD-10-CM

## 2023-05-03 DIAGNOSIS — K70.30 ALCOHOLIC CIRRHOSIS OF LIVER WITHOUT ASCITES: ICD-10-CM

## 2023-05-03 DIAGNOSIS — G47.30 OBSERVED SLEEP APNEA: Primary | ICD-10-CM

## 2023-05-03 DIAGNOSIS — G47.10 HYPERSOMNIA: ICD-10-CM

## 2023-05-03 DIAGNOSIS — G47.8 NON-RESTORATIVE SLEEP: ICD-10-CM

## 2023-05-03 DIAGNOSIS — R06.83 SNORING: ICD-10-CM

## 2023-05-03 PROBLEM — J43.8 OTHER EMPHYSEMA: Status: ACTIVE | Noted: 2023-05-03

## 2023-05-03 NOTE — PROGRESS NOTES
[address] [facility] [address]  Phone: [phone]  Fax: [phone]      Jabari Dixon  [id number]   1970  53 y.o.  male      PCP:Ernesto Cerda MD    Type of service: Initial New Patient Office Visit  Date of service: 5/3/2023    Chief Complaint   Patient presents with   • Witnessed Apnea   • Snoring   • Non-restorative Sleep   • Fatigue   • Dry Mouth   • Daytime Sleepiness   • Obesity       History of present illness;  Jabari Dixon 53 y.o.  is a new patient for me and was seen today for sleep related problems of snoring, non-restorative sleep and witnessed apneas. The symptoms are present for few years and they are persistent in nature.  The snoring is present in all positions and it is loud.  Patient has no prior surgery namely tonsillectomy, nasal surgery and UPPP.     Recently patient was admitted to the hospital with history of respiratory failure and was on BiPAP.  He also has a history of COPD and alcoholic cirrhosis.  In addition he is on Suboxone for narcotic abuse.  He has been sober for the past 5 months..    Patient gives the following sleep history.  Sleep schedule:  Bedtime: 8:30 PM  Wake time: 6:30 AM  Normally takes about 30-60 minutes to fall asleep  Average hours of sleep 6  Number of naps per day 2  Symptoms  In addition to snoring, nonrestorative sleep and witnessed apneas patient gives the following associated symptoms.  Have you ever awakened gasping for breath, coughing, choking: Yes   Change in weight,  No   Morning headaches  No   Awaken with a sore throat or dry mouth  Yes   Leg jerking at night:  Yes   Crawly feeling/urge sensation to move in the legs: No   Teeth grinding:No   Have you ever awakened at night with a sour taste or burning sensation in your chest:  No   Do you have muscle weakness with laughing or anger or sleep paralysis:  No   Have you ever felt paralyzed while going to sleep or waking up:  No   Sleepwalking,  "nightmares, No   Nocturia (urination at night): 0 times per night  Memory Problem:No     Past medical history: (Relevant to sleep medicine)  1. COPD  2. Alcoholic cirrhosis  3. Narcotic drug use  4. Suboxone therapy    • Medications are reviewed by me and documented in the encounter  • Allergies reviewed and documented in encounter    Social history:  Do you drive a commercial vehicle:  No   Shift work:  No   Tobacco use:  Yes   Alcohol use:  0 per week  Caffeinated drinks: 5    FAMILY HISTORY (Your mother, father, brothers and sisters) (relevant to sleep medicine)  1. Restless leg syndrome    REVIEW OF SYSTEMS.  Full review of systems available on the intake form which is scanned in the media tab.  The relevant positive are noted below  1. Daytime excessive sleepiness with [place] Sleepiness Scale :Total score: 16   2. Snoring  3. Shortness of breath      Physical exam:  Vitals:    05/03/23 1100   BP: 98/61   Pulse: 63   SpO2: 91%   Weight: 115 kg (252 lb 12.8 oz)   Height: 174 cm (68.5\")    Body mass index is 37.88 kg/m². Neck Circumference: 16.5 inches  Nose: no nasal septal defects or deviation and the nasal passages are clear, no nasal polyps,  Throat: tonsils are at enlarged, tongue normal, oral airway Mallampati class 3  NECK:Neck Circumference: 16.5 inches, trachea is in the midline, thyroid not enlarged  RESPIRATORY SYSTEM: Breath sounds are equal on both sides, there are no wheezes   CARDIOVASULAR SYSTEM: Heart sounds are regular rhythm and indira rate, no edema  EXTREMITES: No cyanosis, clubbing  NEUROLOGICAL SYSTEM: Oriented x 3, no gross motor defects, gait normal       Reviewed hospital discharge summary    Assessment and plan:  · Witnessed apnea (R06.81) patient's symptoms and examination is consistent with sleep apnea (G47.30)  I have talked to the patient about the signs and symptoms of sleep apnea. In addition, I have also discussed pathophysiology of sleep apnea.  I also discussed the complications " of untreated sleep apnea including effects on hypertension, diabetes mellitus and nonrestorative sleep with hypersomnia which can increase risk for motor vehicle accidents.  Untreated sleep apnea is also a risk factor for development of atrial fibrillation, pulmonary hypertension, insulin resistance and stroke.  Discussed in detail of various testing methods including home-based and lab based sleep studies.  Based on history and physical examination the most appropriate study is split-night study.  The order for the sleep study is placed in [facility].  The test will be scheduled after approval from insurance. Treatment and management will be discussed after the test is completed.  Patient was given opportunity to ask questions and all the questions were answered.  · Concerned about central sleep apnea due to cirrhosis and Suboxone usage for narcotic withdrawals  · Snoring (R06.83) snoring is the sound created by turbulent airflow vibrating upper airway soft tissue.  I have also discussed factors affecting snoring including sleep deprivation, sleeping on the back and alcohol ingestion. To minimize snoring, patient is advised to have adequate sleep, sleep on the side and avoid alcohol and sedative medications before bedtime  · Daytime excessive sleepiness .  It was assessed with [place] Sleepiness Scale of Total score: 16.  There are many causes for daytime excessive sleepiness including sleep depression, shiftwork syndrome, depression and other medical disorders including heart, kidney and liver failure.  The most serious cause of excessive sleepiness is due to neurological conditions like narcolepsy/cataplexy.  But the most common cause of excessive sleepiness is due to sleep apnea with frequent awakenings during sleep time.  I have discussed safety of driving and to remain vigilant while driving.  · Obesity 2, with BMI Body mass index is 37.88 kg/m².. I have discussed the relationship between weight and sleep  apnea.There is direct correlation between weight and severity of sleep apnea.  Weight reduction is encouraged, as it is going to reduce the severity of sleep apnea. I have also discussed with the patient diet and exercise to achieve ideal body weight  · COPD  · Alcoholic cirrhosis,     I have also discussed with the patient the following  • Sleep hygiene: Maintaining a regular bedtime and wake time, not to watch television or work in bed, limit caffeine-containing beverages before bed time and avoid naps during the day  • Adequate amount of sleep.  Generally most people needs about 7 to 8 hours of sleep.  • Return for 31 to 90 days after PAP setup with down load..  Patient's questions were answered.      5/3/2023  Maverick Ryan MD  Sleep Medicine  Medical Director  [facility]: [facility] sleep [facility]

## 2023-06-06 ENCOUNTER — HOSPITAL ENCOUNTER (OUTPATIENT)
Dept: SLEEP MEDICINE | Facility: HOSPITAL | Age: 53
Discharge: HOME | End: 2023-06-06
Admitting: INTERNAL MEDICINE
Payer: MEDICARE

## 2023-06-06 DIAGNOSIS — G47.30 OBSERVED SLEEP APNEA: ICD-10-CM

## 2023-06-06 DIAGNOSIS — F11.10 NARCOTIC ABUSE: ICD-10-CM

## 2023-06-06 DIAGNOSIS — R06.83 SNORING: ICD-10-CM

## 2023-06-06 DIAGNOSIS — K70.30 ALCOHOLIC CIRRHOSIS OF LIVER WITHOUT ASCITES: ICD-10-CM

## 2023-06-06 DIAGNOSIS — G47.10 HYPERSOMNIA: ICD-10-CM

## 2023-06-06 DIAGNOSIS — G47.8 NON-RESTORATIVE SLEEP: ICD-10-CM

## 2023-06-06 PROCEDURE — 95810 POLYSOM 6/> YRS 4/> PARAM: CPT | Performed by: INTERNAL MEDICINE

## 2023-06-06 PROCEDURE — 95810 POLYSOM 6/> YRS 4/> PARAM: CPT

## 2023-06-07 DIAGNOSIS — G47.34 SLEEP RELATED HYPOXIA: ICD-10-CM

## 2023-06-07 DIAGNOSIS — J44.9 CHRONIC OBSTRUCTIVE PULMONARY DISEASE, UNSPECIFIED COPD TYPE: ICD-10-CM

## 2023-06-07 DIAGNOSIS — K70.30 ALCOHOLIC CIRRHOSIS, UNSPECIFIED WHETHER ASCITES PRESENT: ICD-10-CM

## 2023-06-07 DIAGNOSIS — G47.33 OSA (OBSTRUCTIVE SLEEP APNEA): Primary | ICD-10-CM

## 2023-06-08 ENCOUNTER — TELEPHONE (OUTPATIENT)
Dept: SLEEP MEDICINE | Facility: HOSPITAL | Age: 53
End: 2023-06-08
Payer: MEDICARE

## 2023-06-26 PROBLEM — G93.41 ACUTE METABOLIC ENCEPHALOPATHY: Status: ACTIVE | Noted: 2023-06-26

## 2023-06-26 PROBLEM — J96.02 ACUTE RESPIRATORY FAILURE WITH HYPOXIA AND HYPERCAPNIA: Status: ACTIVE | Noted: 2023-06-26

## 2023-06-26 PROBLEM — J96.01 ACUTE RESPIRATORY FAILURE WITH HYPOXIA AND HYPERCAPNIA: Status: ACTIVE | Noted: 2023-06-26
